# Patient Record
Sex: FEMALE | Race: WHITE | NOT HISPANIC OR LATINO | Employment: OTHER | URBAN - METROPOLITAN AREA
[De-identification: names, ages, dates, MRNs, and addresses within clinical notes are randomized per-mention and may not be internally consistent; named-entity substitution may affect disease eponyms.]

---

## 2017-05-17 ENCOUNTER — RECORDS - HEALTHEAST (OUTPATIENT)
Dept: LAB | Facility: CLINIC | Age: 82
End: 2017-05-17

## 2017-05-18 LAB — HBA1C MFR BLD: 5.8 % (ref 4.2–6.1)

## 2017-05-30 ENCOUNTER — RECORDS - HEALTHEAST (OUTPATIENT)
Dept: BONE DENSITY | Facility: CLINIC | Age: 82
End: 2017-05-30

## 2017-05-30 ENCOUNTER — RECORDS - HEALTHEAST (OUTPATIENT)
Dept: ADMINISTRATIVE | Facility: OTHER | Age: 82
End: 2017-05-30

## 2017-05-30 DIAGNOSIS — M81.0 AGE-RELATED OSTEOPOROSIS WITHOUT CURRENT PATHOLOGICAL FRACTURE: ICD-10-CM

## 2018-09-22 ENCOUNTER — RECORDS - HEALTHEAST (OUTPATIENT)
Dept: LAB | Facility: CLINIC | Age: 83
End: 2018-09-22

## 2018-09-22 LAB
ALBUMIN UR-MCNC: NEGATIVE MG/DL
APPEARANCE UR: CLEAR
BILIRUB UR QL STRIP: NEGATIVE
COLOR UR AUTO: NORMAL
GLUCOSE UR STRIP-MCNC: NEGATIVE MG/DL
HGB UR QL STRIP: NEGATIVE
KETONES UR STRIP-MCNC: NEGATIVE MG/DL
LEUKOCYTE ESTERASE UR QL STRIP: NEGATIVE
NITRATE UR QL: NEGATIVE
PH UR STRIP: 6.5 [PH] (ref 4.5–8)
SP GR UR STRIP: 1.01 (ref 1–1.03)
UROBILINOGEN UR STRIP-ACNC: NORMAL

## 2018-09-23 LAB — BACTERIA SPEC CULT: NO GROWTH

## 2018-12-01 ENCOUNTER — RECORDS - HEALTHEAST (OUTPATIENT)
Dept: LAB | Facility: CLINIC | Age: 83
End: 2018-12-01

## 2018-12-03 LAB
ANION GAP SERPL CALCULATED.3IONS-SCNC: 7 MMOL/L (ref 5–18)
BUN SERPL-MCNC: 10 MG/DL (ref 8–28)
CALCIUM SERPL-MCNC: 9 MG/DL (ref 8.5–10.5)
CHLORIDE BLD-SCNC: 97 MMOL/L (ref 98–107)
CO2 SERPL-SCNC: 23 MMOL/L (ref 22–31)
CREAT SERPL-MCNC: 0.76 MG/DL (ref 0.6–1.1)
GFR SERPL CREATININE-BSD FRML MDRD: >60 ML/MIN/1.73M2
GLUCOSE BLD-MCNC: 98 MG/DL (ref 70–125)
POTASSIUM BLD-SCNC: 5.2 MMOL/L (ref 3.5–5)
SODIUM SERPL-SCNC: 127 MMOL/L (ref 136–145)

## 2019-01-04 ENCOUNTER — RECORDS - HEALTHEAST (OUTPATIENT)
Dept: LAB | Facility: CLINIC | Age: 84
End: 2019-01-04

## 2019-01-07 LAB — POTASSIUM BLD-SCNC: 4.5 MMOL/L (ref 3.5–5)

## 2019-05-28 ENCOUNTER — RECORDS - HEALTHEAST (OUTPATIENT)
Dept: LAB | Facility: CLINIC | Age: 84
End: 2019-05-28

## 2019-05-28 LAB
ANION GAP SERPL CALCULATED.3IONS-SCNC: 7 MMOL/L (ref 5–18)
BUN SERPL-MCNC: 13 MG/DL (ref 8–28)
CALCIUM SERPL-MCNC: 9.8 MG/DL (ref 8.5–10.5)
CHLORIDE BLD-SCNC: 98 MMOL/L (ref 98–107)
CO2 SERPL-SCNC: 26 MMOL/L (ref 22–31)
CREAT SERPL-MCNC: 0.8 MG/DL (ref 0.6–1.1)
GFR SERPL CREATININE-BSD FRML MDRD: >60 ML/MIN/1.73M2
GLUCOSE BLD-MCNC: 93 MG/DL (ref 70–125)
POTASSIUM BLD-SCNC: 4.8 MMOL/L (ref 3.5–5)
SODIUM SERPL-SCNC: 131 MMOL/L (ref 136–145)

## 2019-12-31 ENCOUNTER — RECORDS - HEALTHEAST (OUTPATIENT)
Dept: LAB | Facility: CLINIC | Age: 84
End: 2019-12-31

## 2019-12-31 LAB
ANION GAP SERPL CALCULATED.3IONS-SCNC: 9 MMOL/L (ref 5–18)
BUN SERPL-MCNC: 11 MG/DL (ref 8–28)
CALCIUM SERPL-MCNC: 9.7 MG/DL (ref 8.5–10.5)
CHLORIDE BLD-SCNC: 98 MMOL/L (ref 98–107)
CO2 SERPL-SCNC: 21 MMOL/L (ref 22–31)
CREAT SERPL-MCNC: 0.82 MG/DL (ref 0.6–1.1)
GFR SERPL CREATININE-BSD FRML MDRD: >60 ML/MIN/1.73M2
GLUCOSE BLD-MCNC: 96 MG/DL (ref 70–125)
HGB BLD-MCNC: 13.4 G/DL (ref 12–16)
POTASSIUM BLD-SCNC: 4.6 MMOL/L (ref 3.5–5)
SODIUM SERPL-SCNC: 128 MMOL/L (ref 136–145)

## 2020-11-29 ENCOUNTER — RECORDS - HEALTHEAST (OUTPATIENT)
Dept: LAB | Facility: CLINIC | Age: 85
End: 2020-11-29

## 2020-12-01 LAB
ANION GAP SERPL CALCULATED.3IONS-SCNC: 7 MMOL/L (ref 5–18)
BUN SERPL-MCNC: 12 MG/DL (ref 8–28)
CALCIUM SERPL-MCNC: 9.5 MG/DL (ref 8.5–10.5)
CHLORIDE BLD-SCNC: 95 MMOL/L (ref 98–107)
CO2 SERPL-SCNC: 27 MMOL/L (ref 22–31)
CREAT SERPL-MCNC: 0.85 MG/DL (ref 0.6–1.1)
ERYTHROCYTE [DISTWIDTH] IN BLOOD BY AUTOMATED COUNT: 14.6 % (ref 11–14.5)
GFR SERPL CREATININE-BSD FRML MDRD: >60 ML/MIN/1.73M2
GLUCOSE BLD-MCNC: 75 MG/DL (ref 70–125)
HCT VFR BLD AUTO: 39.1 % (ref 35–47)
HGB BLD-MCNC: 12.9 G/DL (ref 12–16)
MCH RBC QN AUTO: 27.9 PG (ref 27–34)
MCHC RBC AUTO-ENTMCNC: 33 G/DL (ref 32–36)
MCV RBC AUTO: 85 FL (ref 80–100)
PLATELET # BLD AUTO: 325 THOU/UL (ref 140–440)
PMV BLD AUTO: 9.9 FL (ref 8.5–12.5)
POTASSIUM BLD-SCNC: 4.3 MMOL/L (ref 3.5–5)
RBC # BLD AUTO: 4.62 MILL/UL (ref 3.8–5.4)
SODIUM SERPL-SCNC: 129 MMOL/L (ref 136–145)
WBC: 8.8 THOU/UL (ref 4–11)

## 2021-06-12 ENCOUNTER — RECORDS - HEALTHEAST (OUTPATIENT)
Dept: LAB | Facility: CLINIC | Age: 86
End: 2021-06-12

## 2021-06-15 LAB
ALBUMIN SERPL-MCNC: 3.2 G/DL (ref 3.5–5)
ALP SERPL-CCNC: 67 U/L (ref 45–120)
ALT SERPL W P-5'-P-CCNC: 9 U/L (ref 0–45)
ANION GAP SERPL CALCULATED.3IONS-SCNC: 10 MMOL/L (ref 5–18)
AST SERPL W P-5'-P-CCNC: 11 U/L (ref 0–40)
BILIRUB DIRECT SERPL-MCNC: 0.2 MG/DL
BILIRUB SERPL-MCNC: 0.3 MG/DL (ref 0–1)
BUN SERPL-MCNC: 10 MG/DL (ref 8–28)
CALCIUM SERPL-MCNC: 9.2 MG/DL (ref 8.5–10.5)
CHLORIDE BLD-SCNC: 95 MMOL/L (ref 98–107)
CO2 SERPL-SCNC: 24 MMOL/L (ref 22–31)
CREAT SERPL-MCNC: 0.79 MG/DL (ref 0.6–1.1)
GFR SERPL CREATININE-BSD FRML MDRD: >60 ML/MIN/1.73M2
GLUCOSE BLD-MCNC: 95 MG/DL (ref 70–125)
POTASSIUM BLD-SCNC: 4.4 MMOL/L (ref 3.5–5)
PROT SERPL-MCNC: 7.2 G/DL (ref 6–8)
SODIUM SERPL-SCNC: 129 MMOL/L (ref 136–145)
TSH SERPL DL<=0.005 MIU/L-ACNC: 1.98 UIU/ML (ref 0.3–5)

## 2021-06-16 LAB — 25(OH)D3 SERPL-MCNC: 25.2 NG/ML (ref 30–80)

## 2021-10-15 ENCOUNTER — LAB REQUISITION (OUTPATIENT)
Dept: LAB | Facility: CLINIC | Age: 86
End: 2021-10-15
Payer: COMMERCIAL

## 2021-10-15 DIAGNOSIS — E87.1 HYPO-OSMOLALITY AND HYPONATREMIA: ICD-10-CM

## 2021-10-15 DIAGNOSIS — E55.9 VITAMIN D DEFICIENCY, UNSPECIFIED: ICD-10-CM

## 2021-10-18 LAB — SODIUM SERPL-SCNC: 132 MMOL/L (ref 136–145)

## 2021-10-18 PROCEDURE — 36415 COLL VENOUS BLD VENIPUNCTURE: CPT | Mod: ORL | Performed by: NURSE PRACTITIONER

## 2021-10-18 PROCEDURE — 82306 VITAMIN D 25 HYDROXY: CPT | Mod: ORL | Performed by: NURSE PRACTITIONER

## 2021-10-18 PROCEDURE — 84295 ASSAY OF SERUM SODIUM: CPT | Mod: ORL | Performed by: NURSE PRACTITIONER

## 2021-10-19 LAB — DEPRECATED CALCIDIOL+CALCIFEROL SERPL-MC: 42 UG/L (ref 30–80)

## 2022-03-18 ENCOUNTER — LAB REQUISITION (OUTPATIENT)
Dept: LAB | Facility: CLINIC | Age: 87
End: 2022-03-18
Payer: COMMERCIAL

## 2022-03-18 DIAGNOSIS — E87.1 HYPO-OSMOLALITY AND HYPONATREMIA: ICD-10-CM

## 2022-03-18 DIAGNOSIS — I10 ESSENTIAL (PRIMARY) HYPERTENSION: ICD-10-CM

## 2022-03-21 LAB
ANION GAP SERPL CALCULATED.3IONS-SCNC: 9 MMOL/L (ref 5–18)
BUN SERPL-MCNC: 12 MG/DL (ref 8–28)
CALCIUM SERPL-MCNC: 9.2 MG/DL (ref 8.5–10.5)
CHLORIDE BLD-SCNC: 98 MMOL/L (ref 98–107)
CO2 SERPL-SCNC: 24 MMOL/L (ref 22–31)
CREAT SERPL-MCNC: 0.77 MG/DL (ref 0.6–1.1)
GFR SERPL CREATININE-BSD FRML MDRD: 74 ML/MIN/1.73M2
GLUCOSE BLD-MCNC: 103 MG/DL (ref 70–125)
POTASSIUM BLD-SCNC: 4.4 MMOL/L (ref 3.5–5)
SODIUM SERPL-SCNC: 131 MMOL/L (ref 136–145)

## 2022-03-21 PROCEDURE — P9603 ONE-WAY ALLOW PRORATED MILES: HCPCS | Mod: ORL | Performed by: INTERNAL MEDICINE

## 2022-03-21 PROCEDURE — 36415 COLL VENOUS BLD VENIPUNCTURE: CPT | Mod: ORL | Performed by: INTERNAL MEDICINE

## 2022-03-21 PROCEDURE — 80048 BASIC METABOLIC PNL TOTAL CA: CPT | Mod: ORL | Performed by: INTERNAL MEDICINE

## 2022-10-23 ENCOUNTER — LAB REQUISITION (OUTPATIENT)
Dept: LAB | Facility: CLINIC | Age: 87
End: 2022-10-23
Payer: COMMERCIAL

## 2022-10-23 DIAGNOSIS — Z03.818 ENCOUNTER FOR OBSERVATION FOR SUSPECTED EXPOSURE TO OTHER BIOLOGICAL AGENTS RULED OUT: ICD-10-CM

## 2022-10-23 PROCEDURE — U0003 INFECTIOUS AGENT DETECTION BY NUCLEIC ACID (DNA OR RNA); SEVERE ACUTE RESPIRATORY SYNDROME CORONAVIRUS 2 (SARS-COV-2) (CORONAVIRUS DISEASE [COVID-19]), AMPLIFIED PROBE TECHNIQUE, MAKING USE OF HIGH THROUGHPUT TECHNOLOGIES AS DESCRIBED BY CMS-2020-01-R: HCPCS | Mod: ORL | Performed by: NURSE PRACTITIONER

## 2022-10-24 LAB — SARS-COV-2 RNA RESP QL NAA+PROBE: POSITIVE

## 2023-02-01 ENCOUNTER — LAB REQUISITION (OUTPATIENT)
Dept: LAB | Facility: CLINIC | Age: 88
End: 2023-02-01
Payer: COMMERCIAL

## 2023-02-01 DIAGNOSIS — E55.9 VITAMIN D DEFICIENCY, UNSPECIFIED: ICD-10-CM

## 2023-02-01 DIAGNOSIS — E78.5 HYPERLIPIDEMIA, UNSPECIFIED: ICD-10-CM

## 2023-02-01 DIAGNOSIS — I10 ESSENTIAL (PRIMARY) HYPERTENSION: ICD-10-CM

## 2023-02-02 LAB
ANION GAP SERPL CALCULATED.3IONS-SCNC: 13 MMOL/L (ref 7–15)
BUN SERPL-MCNC: 11.7 MG/DL (ref 8–23)
CALCIUM SERPL-MCNC: 9.4 MG/DL (ref 8.8–10.2)
CHLORIDE SERPL-SCNC: 99 MMOL/L (ref 98–107)
CREAT SERPL-MCNC: 0.84 MG/DL (ref 0.51–0.95)
DEPRECATED HCO3 PLAS-SCNC: 21 MMOL/L (ref 22–29)
ERYTHROCYTE [DISTWIDTH] IN BLOOD BY AUTOMATED COUNT: 14.9 % (ref 10–15)
GFR SERPL CREATININE-BSD FRML MDRD: 66 ML/MIN/1.73M2
GLUCOSE SERPL-MCNC: 96 MG/DL (ref 70–99)
HCT VFR BLD AUTO: 40.5 % (ref 35–47)
HGB BLD-MCNC: 13.2 G/DL (ref 11.7–15.7)
MCH RBC QN AUTO: 27.8 PG (ref 26.5–33)
MCHC RBC AUTO-ENTMCNC: 32.6 G/DL (ref 31.5–36.5)
MCV RBC AUTO: 85 FL (ref 78–100)
PLATELET # BLD AUTO: 382 10E3/UL (ref 150–450)
POTASSIUM SERPL-SCNC: 4.3 MMOL/L (ref 3.4–5.3)
RBC # BLD AUTO: 4.74 10E6/UL (ref 3.8–5.2)
SODIUM SERPL-SCNC: 133 MMOL/L (ref 136–145)
TSH SERPL DL<=0.005 MIU/L-ACNC: 3.51 UIU/ML (ref 0.3–4.2)
WBC # BLD AUTO: 11.1 10E3/UL (ref 4–11)

## 2023-02-02 PROCEDURE — 84443 ASSAY THYROID STIM HORMONE: CPT | Mod: ORL | Performed by: NURSE PRACTITIONER

## 2023-02-02 PROCEDURE — 82306 VITAMIN D 25 HYDROXY: CPT | Mod: ORL | Performed by: NURSE PRACTITIONER

## 2023-02-02 PROCEDURE — 85027 COMPLETE CBC AUTOMATED: CPT | Mod: ORL | Performed by: NURSE PRACTITIONER

## 2023-02-02 PROCEDURE — 80048 BASIC METABOLIC PNL TOTAL CA: CPT | Mod: ORL | Performed by: NURSE PRACTITIONER

## 2023-02-02 PROCEDURE — P9604 ONE-WAY ALLOW PRORATED TRIP: HCPCS | Mod: ORL | Performed by: NURSE PRACTITIONER

## 2023-02-02 PROCEDURE — 36415 COLL VENOUS BLD VENIPUNCTURE: CPT | Mod: ORL | Performed by: NURSE PRACTITIONER

## 2023-02-03 LAB — DEPRECATED CALCIDIOL+CALCIFEROL SERPL-MC: 71 UG/L (ref 20–75)

## 2024-03-17 ENCOUNTER — APPOINTMENT (OUTPATIENT)
Dept: CT IMAGING | Facility: HOSPITAL | Age: 89
End: 2024-03-17
Attending: EMERGENCY MEDICINE
Payer: COMMERCIAL

## 2024-03-17 ENCOUNTER — HOSPITAL ENCOUNTER (OUTPATIENT)
Facility: HOSPITAL | Age: 89
Setting detail: OBSERVATION
Discharge: NURSING, REHAB OR LONG-TERM CARE | End: 2024-03-19
Attending: EMERGENCY MEDICINE | Admitting: STUDENT IN AN ORGANIZED HEALTH CARE EDUCATION/TRAINING PROGRAM
Payer: COMMERCIAL

## 2024-03-17 ENCOUNTER — APPOINTMENT (OUTPATIENT)
Dept: RADIOLOGY | Facility: HOSPITAL | Age: 89
End: 2024-03-17
Attending: EMERGENCY MEDICINE
Payer: COMMERCIAL

## 2024-03-17 DIAGNOSIS — R55 SYNCOPE AND COLLAPSE: ICD-10-CM

## 2024-03-17 DIAGNOSIS — S02.2XXA CLOSED FRACTURE OF NASAL BONE, INITIAL ENCOUNTER: ICD-10-CM

## 2024-03-17 DIAGNOSIS — Z86.79 HISTORY OF CORONARY ARTERY DISEASE: ICD-10-CM

## 2024-03-17 DIAGNOSIS — Z86.79 HISTORY OF HYPERTENSION: ICD-10-CM

## 2024-03-17 DIAGNOSIS — S42.211A CLOSED DISPLACED FRACTURE OF SURGICAL NECK OF RIGHT HUMERUS, UNSPECIFIED FRACTURE MORPHOLOGY, INITIAL ENCOUNTER: ICD-10-CM

## 2024-03-17 LAB
ANION GAP SERPL CALCULATED.3IONS-SCNC: 14 MMOL/L (ref 7–15)
BASOPHILS # BLD AUTO: 0.1 10E3/UL (ref 0–0.2)
BASOPHILS NFR BLD AUTO: 1 %
BUN SERPL-MCNC: 12.5 MG/DL (ref 8–23)
CALCIUM SERPL-MCNC: 9.8 MG/DL (ref 8.8–10.2)
CHLORIDE SERPL-SCNC: 99 MMOL/L (ref 98–107)
CREAT SERPL-MCNC: 0.91 MG/DL (ref 0.51–0.95)
DEPRECATED HCO3 PLAS-SCNC: 20 MMOL/L (ref 22–29)
EGFRCR SERPLBLD CKD-EPI 2021: 60 ML/MIN/1.73M2
EOSINOPHIL # BLD AUTO: 0.2 10E3/UL (ref 0–0.7)
EOSINOPHIL NFR BLD AUTO: 1 %
ERYTHROCYTE [DISTWIDTH] IN BLOOD BY AUTOMATED COUNT: 14.6 % (ref 10–15)
ERYTHROCYTE [DISTWIDTH] IN BLOOD BY AUTOMATED COUNT: 14.7 % (ref 10–15)
GLUCOSE SERPL-MCNC: 148 MG/DL (ref 70–99)
HCT VFR BLD AUTO: 38.9 % (ref 35–47)
HCT VFR BLD AUTO: 39.3 % (ref 35–47)
HGB BLD-MCNC: 12.8 G/DL (ref 11.7–15.7)
HGB BLD-MCNC: 12.9 G/DL (ref 11.7–15.7)
HOLD SPECIMEN: NORMAL
IMM GRANULOCYTES # BLD: 0.1 10E3/UL
IMM GRANULOCYTES NFR BLD: 1 %
LYMPHOCYTES # BLD AUTO: 3.7 10E3/UL (ref 0.8–5.3)
LYMPHOCYTES NFR BLD AUTO: 35 %
MAGNESIUM SERPL-MCNC: 2.2 MG/DL (ref 1.7–2.3)
MCH RBC QN AUTO: 27.9 PG (ref 26.5–33)
MCH RBC QN AUTO: 28 PG (ref 26.5–33)
MCHC RBC AUTO-ENTMCNC: 32.8 G/DL (ref 31.5–36.5)
MCHC RBC AUTO-ENTMCNC: 32.9 G/DL (ref 31.5–36.5)
MCV RBC AUTO: 85 FL (ref 78–100)
MCV RBC AUTO: 85 FL (ref 78–100)
MONOCYTES # BLD AUTO: 0.7 10E3/UL (ref 0–1.3)
MONOCYTES NFR BLD AUTO: 7 %
NEUTROPHILS # BLD AUTO: 5.9 10E3/UL (ref 1.6–8.3)
NEUTROPHILS NFR BLD AUTO: 55 %
NRBC # BLD AUTO: 0 10E3/UL
NRBC BLD AUTO-RTO: 0 /100
PLATELET # BLD AUTO: 327 10E3/UL (ref 150–450)
PLATELET # BLD AUTO: 333 10E3/UL (ref 150–450)
POTASSIUM SERPL-SCNC: 4.1 MMOL/L (ref 3.4–5.3)
RBC # BLD AUTO: 4.57 10E6/UL (ref 3.8–5.2)
RBC # BLD AUTO: 4.63 10E6/UL (ref 3.8–5.2)
SODIUM SERPL-SCNC: 133 MMOL/L (ref 135–145)
T4 FREE SERPL-MCNC: 1.31 NG/DL (ref 0.9–1.7)
TROPONIN T SERPL HS-MCNC: 16 NG/L
TSH SERPL DL<=0.005 MIU/L-ACNC: 6.39 UIU/ML (ref 0.3–4.2)
WBC # BLD AUTO: 10.6 10E3/UL (ref 4–11)
WBC # BLD AUTO: 15.9 10E3/UL (ref 4–11)

## 2024-03-17 PROCEDURE — 84484 ASSAY OF TROPONIN QUANT: CPT | Performed by: EMERGENCY MEDICINE

## 2024-03-17 PROCEDURE — 250N000011 HC RX IP 250 OP 636: Performed by: STUDENT IN AN ORGANIZED HEALTH CARE EDUCATION/TRAINING PROGRAM

## 2024-03-17 PROCEDURE — 96376 TX/PRO/DX INJ SAME DRUG ADON: CPT

## 2024-03-17 PROCEDURE — 73030 X-RAY EXAM OF SHOULDER: CPT | Mod: RT

## 2024-03-17 PROCEDURE — 258N000003 HC RX IP 258 OP 636: Performed by: EMERGENCY MEDICINE

## 2024-03-17 PROCEDURE — 36415 COLL VENOUS BLD VENIPUNCTURE: CPT | Performed by: EMERGENCY MEDICINE

## 2024-03-17 PROCEDURE — 93005 ELECTROCARDIOGRAM TRACING: CPT | Performed by: EMERGENCY MEDICINE

## 2024-03-17 PROCEDURE — 96375 TX/PRO/DX INJ NEW DRUG ADDON: CPT

## 2024-03-17 PROCEDURE — 36415 COLL VENOUS BLD VENIPUNCTURE: CPT | Performed by: STUDENT IN AN ORGANIZED HEALTH CARE EDUCATION/TRAINING PROGRAM

## 2024-03-17 PROCEDURE — 83735 ASSAY OF MAGNESIUM: CPT | Performed by: EMERGENCY MEDICINE

## 2024-03-17 PROCEDURE — 96361 HYDRATE IV INFUSION ADD-ON: CPT

## 2024-03-17 PROCEDURE — 70450 CT HEAD/BRAIN W/O DYE: CPT

## 2024-03-17 PROCEDURE — 96374 THER/PROPH/DIAG INJ IV PUSH: CPT

## 2024-03-17 PROCEDURE — 84443 ASSAY THYROID STIM HORMONE: CPT | Performed by: EMERGENCY MEDICINE

## 2024-03-17 PROCEDURE — 85027 COMPLETE CBC AUTOMATED: CPT | Mod: 91 | Performed by: STUDENT IN AN ORGANIZED HEALTH CARE EDUCATION/TRAINING PROGRAM

## 2024-03-17 PROCEDURE — 250N000011 HC RX IP 250 OP 636: Performed by: EMERGENCY MEDICINE

## 2024-03-17 PROCEDURE — 70486 CT MAXILLOFACIAL W/O DYE: CPT

## 2024-03-17 PROCEDURE — 250N000013 HC RX MED GY IP 250 OP 250 PS 637: Performed by: STUDENT IN AN ORGANIZED HEALTH CARE EDUCATION/TRAINING PROGRAM

## 2024-03-17 PROCEDURE — 71045 X-RAY EXAM CHEST 1 VIEW: CPT

## 2024-03-17 PROCEDURE — 73060 X-RAY EXAM OF HUMERUS: CPT | Mod: RT

## 2024-03-17 PROCEDURE — 85025 COMPLETE CBC W/AUTO DIFF WBC: CPT | Performed by: EMERGENCY MEDICINE

## 2024-03-17 PROCEDURE — 84439 ASSAY OF FREE THYROXINE: CPT | Performed by: EMERGENCY MEDICINE

## 2024-03-17 PROCEDURE — G0378 HOSPITAL OBSERVATION PER HR: HCPCS

## 2024-03-17 PROCEDURE — 72125 CT NECK SPINE W/O DYE: CPT

## 2024-03-17 PROCEDURE — 99285 EMERGENCY DEPT VISIT HI MDM: CPT | Mod: 25

## 2024-03-17 PROCEDURE — 87040 BLOOD CULTURE FOR BACTERIA: CPT | Performed by: STUDENT IN AN ORGANIZED HEALTH CARE EDUCATION/TRAINING PROGRAM

## 2024-03-17 PROCEDURE — 258N000003 HC RX IP 258 OP 636: Performed by: STUDENT IN AN ORGANIZED HEALTH CARE EDUCATION/TRAINING PROGRAM

## 2024-03-17 PROCEDURE — 80048 BASIC METABOLIC PNL TOTAL CA: CPT | Performed by: EMERGENCY MEDICINE

## 2024-03-17 PROCEDURE — 99223 1ST HOSP IP/OBS HIGH 75: CPT | Performed by: STUDENT IN AN ORGANIZED HEALTH CARE EDUCATION/TRAINING PROGRAM

## 2024-03-17 RX ORDER — NALOXONE HYDROCHLORIDE 0.4 MG/ML
0.2 INJECTION, SOLUTION INTRAMUSCULAR; INTRAVENOUS; SUBCUTANEOUS
Status: DISCONTINUED | OUTPATIENT
Start: 2024-03-17 | End: 2024-03-19 | Stop reason: HOSPADM

## 2024-03-17 RX ORDER — CEFTRIAXONE 2 G/1
2 INJECTION, POWDER, FOR SOLUTION INTRAMUSCULAR; INTRAVENOUS
Status: DISCONTINUED | OUTPATIENT
Start: 2024-03-17 | End: 2024-03-18

## 2024-03-17 RX ORDER — AMLODIPINE BESYLATE 5 MG/1
5 TABLET ORAL DAILY
Status: ON HOLD | COMMUNITY
End: 2024-03-19

## 2024-03-17 RX ORDER — AMLODIPINE BESYLATE 5 MG/1
5 TABLET ORAL DAILY
Status: DISCONTINUED | OUTPATIENT
Start: 2024-03-17 | End: 2024-03-18

## 2024-03-17 RX ORDER — CHOLECALCIFEROL (VITAMIN D3) 50 MCG
2 TABLET ORAL DAILY
COMMUNITY

## 2024-03-17 RX ORDER — GUAIFENESIN 200 MG/10ML
200 LIQUID ORAL EVERY 6 HOURS PRN
COMMUNITY
End: 2024-03-22

## 2024-03-17 RX ORDER — HYDRALAZINE HYDROCHLORIDE 20 MG/ML
10 INJECTION INTRAMUSCULAR; INTRAVENOUS EVERY 6 HOURS PRN
Status: DISCONTINUED | OUTPATIENT
Start: 2024-03-17 | End: 2024-03-19 | Stop reason: HOSPADM

## 2024-03-17 RX ORDER — HYDROMORPHONE HCL IN WATER/PF 6 MG/30 ML
0.4 PATIENT CONTROLLED ANALGESIA SYRINGE INTRAVENOUS
Status: DISCONTINUED | OUTPATIENT
Start: 2024-03-17 | End: 2024-03-19 | Stop reason: HOSPADM

## 2024-03-17 RX ORDER — CARBOXYMETHYLCELLULOSE SODIUM 5 MG/ML
2 SOLUTION/ DROPS OPHTHALMIC PRN
COMMUNITY
End: 2024-03-22

## 2024-03-17 RX ORDER — ONDANSETRON 2 MG/ML
4 INJECTION INTRAMUSCULAR; INTRAVENOUS EVERY 6 HOURS PRN
Status: DISCONTINUED | OUTPATIENT
Start: 2024-03-17 | End: 2024-03-19 | Stop reason: HOSPADM

## 2024-03-17 RX ORDER — NALOXONE HYDROCHLORIDE 0.4 MG/ML
0.4 INJECTION, SOLUTION INTRAMUSCULAR; INTRAVENOUS; SUBCUTANEOUS
Status: DISCONTINUED | OUTPATIENT
Start: 2024-03-17 | End: 2024-03-19 | Stop reason: HOSPADM

## 2024-03-17 RX ORDER — ACETAMINOPHEN 325 MG/1
975 TABLET ORAL 3 TIMES DAILY PRN
Status: DISCONTINUED | OUTPATIENT
Start: 2024-03-17 | End: 2024-03-18

## 2024-03-17 RX ORDER — HYDROMORPHONE HCL IN WATER/PF 6 MG/30 ML
0.2 PATIENT CONTROLLED ANALGESIA SYRINGE INTRAVENOUS
Status: DISCONTINUED | OUTPATIENT
Start: 2024-03-17 | End: 2024-03-19 | Stop reason: HOSPADM

## 2024-03-17 RX ORDER — HYDROMORPHONE HYDROCHLORIDE 1 MG/ML
0.3 INJECTION, SOLUTION INTRAMUSCULAR; INTRAVENOUS; SUBCUTANEOUS ONCE
Status: COMPLETED | OUTPATIENT
Start: 2024-03-17 | End: 2024-03-17

## 2024-03-17 RX ORDER — ONDANSETRON 4 MG/1
4 TABLET, ORALLY DISINTEGRATING ORAL EVERY 6 HOURS PRN
Status: DISCONTINUED | OUTPATIENT
Start: 2024-03-17 | End: 2024-03-19 | Stop reason: HOSPADM

## 2024-03-17 RX ORDER — MORPHINE SULFATE 4 MG/ML
4 INJECTION, SOLUTION INTRAMUSCULAR; INTRAVENOUS ONCE
Status: COMPLETED | OUTPATIENT
Start: 2024-03-17 | End: 2024-03-17

## 2024-03-17 RX ORDER — OXYCODONE HYDROCHLORIDE 5 MG/1
5 TABLET ORAL EVERY 4 HOURS PRN
Status: DISCONTINUED | OUTPATIENT
Start: 2024-03-17 | End: 2024-03-19 | Stop reason: HOSPADM

## 2024-03-17 RX ORDER — HYDROMORPHONE HYDROCHLORIDE 1 MG/ML
0.5 INJECTION, SOLUTION INTRAMUSCULAR; INTRAVENOUS; SUBCUTANEOUS ONCE
Status: COMPLETED | OUTPATIENT
Start: 2024-03-17 | End: 2024-03-17

## 2024-03-17 RX ORDER — AMOXICILLIN 250 MG
2 CAPSULE ORAL 2 TIMES DAILY PRN
Status: DISCONTINUED | OUTPATIENT
Start: 2024-03-17 | End: 2024-03-19 | Stop reason: HOSPADM

## 2024-03-17 RX ORDER — AMOXICILLIN 250 MG
1 CAPSULE ORAL 2 TIMES DAILY PRN
Status: DISCONTINUED | OUTPATIENT
Start: 2024-03-17 | End: 2024-03-19 | Stop reason: HOSPADM

## 2024-03-17 RX ORDER — ACETAMINOPHEN 325 MG/1
650 TABLET ORAL EVERY 8 HOURS PRN
COMMUNITY
End: 2024-03-22

## 2024-03-17 RX ADMIN — AZITHROMYCIN DIHYDRATE 500 MG: 500 INJECTION, POWDER, LYOPHILIZED, FOR SOLUTION INTRAVENOUS at 19:35

## 2024-03-17 RX ADMIN — HYDROMORPHONE HYDROCHLORIDE 0.4 MG: 0.2 INJECTION, SOLUTION INTRAMUSCULAR; INTRAVENOUS; SUBCUTANEOUS at 18:45

## 2024-03-17 RX ADMIN — MORPHINE SULFATE 4 MG: 4 INJECTION, SOLUTION INTRAMUSCULAR; INTRAVENOUS at 15:33

## 2024-03-17 RX ADMIN — HYDROMORPHONE HYDROCHLORIDE 0.5 MG: 1 INJECTION, SOLUTION INTRAMUSCULAR; INTRAVENOUS; SUBCUTANEOUS at 13:46

## 2024-03-17 RX ADMIN — HYDROMORPHONE HYDROCHLORIDE 0.3 MG: 1 INJECTION, SOLUTION INTRAMUSCULAR; INTRAVENOUS; SUBCUTANEOUS at 12:45

## 2024-03-17 RX ADMIN — SODIUM CHLORIDE 250 ML: 9 INJECTION, SOLUTION INTRAVENOUS at 12:41

## 2024-03-17 RX ADMIN — ACETAMINOPHEN 325 MG: 325 TABLET ORAL at 22:37

## 2024-03-17 RX ADMIN — CEFTRIAXONE SODIUM 2 G: 2 INJECTION, POWDER, FOR SOLUTION INTRAMUSCULAR; INTRAVENOUS at 18:59

## 2024-03-17 RX ADMIN — ONDANSETRON 4 MG: 4 TABLET, ORALLY DISINTEGRATING ORAL at 20:29

## 2024-03-17 ASSESSMENT — ENCOUNTER SYMPTOMS: FACIAL SWELLING: 1

## 2024-03-17 ASSESSMENT — ACTIVITIES OF DAILY LIVING (ADL)
ADLS_ACUITY_SCORE: 35
ADLS_ACUITY_SCORE: 31
ADLS_ACUITY_SCORE: 36
ADLS_ACUITY_SCORE: 35
ADLS_ACUITY_SCORE: 35
ADLS_ACUITY_SCORE: 36
DEPENDENT_IADLS:: CLEANING;COOKING;MEDICATION MANAGEMENT;TRANSPORTATION
ADLS_ACUITY_SCORE: 31
ADLS_ACUITY_SCORE: 35

## 2024-03-17 ASSESSMENT — COLUMBIA-SUICIDE SEVERITY RATING SCALE - C-SSRS
2. HAVE YOU ACTUALLY HAD ANY THOUGHTS OF KILLING YOURSELF IN THE PAST MONTH?: NO
1. IN THE PAST MONTH, HAVE YOU WISHED YOU WERE DEAD OR WISHED YOU COULD GO TO SLEEP AND NOT WAKE UP?: NO
6. HAVE YOU EVER DONE ANYTHING, STARTED TO DO ANYTHING, OR PREPARED TO DO ANYTHING TO END YOUR LIFE?: NO

## 2024-03-17 NOTE — ED TRIAGE NOTES
Pt was at [employer], was walking out when she felt dizzy. Pt fell to the ground and lost consciousness for about 30 seconds. Pt awoke and c/o face and right shoulder pain.. EMS splinted the arm and transported Pt to ED room 5.     Triage Assessment (Adult)       Row Name 03/17/24 1230          Triage Assessment    Airway WDL WDL        Respiratory WDL    Respiratory WDL WDL        Skin Circulation/Temperature WDL    Skin Circulation/Temperature WDL temperature     Skin Temperature cool        Cardiac WDL    Cardiac WDL WDL     Cardiac Rhythm SB        Peripheral/Neurovascular WDL    Peripheral Neurovascular WDL WDL        Cognitive/Neuro/Behavioral WDL    Cognitive/Neuro/Behavioral WDL WDL

## 2024-03-17 NOTE — ED NOTES
Bed: JNED-05  Expected date:   Expected time:   Means of arrival:   Comments:  Luda BROWN Syncope

## 2024-03-17 NOTE — ED NOTES
"[facility] ED Handoff Report    ED Chief Complaint: Fall, head injury, shoulder injury.     ED Diagnosis:  (R55) Syncope and collapse  Comment: Witnessed syncope at [employer] with 30 seconds of LOC.   Plan: Monitor EKG, LOC.     (S02.2XXA) Closed fracture of nasal bone, initial encounter  Comment: Fx in several places.   Plan: Pain management.     (S42.211A) Closed displaced fracture of surgical neck of right humerus, unspecified fracture morphology, initial encounter  Comment: R shoulder pain, chip off of R humerus.   Plan: Sling, pain management.     (Z86.79) History of hypertension  Comment: Initially HTN up to 190's.   Plan: Improved to 140's systolic.     PMH:  No past medical history on file.     Code Status:  No Order     Falls Risk: Yes Band: Applied    Current Living Situation/Residence: lives in an assisted living facility     Elimination Status: Continent: Yes     Activity Level: Total assist/lift, usually independent, has not been up since coming to ED. Hover mat in place.     Patients Preferred Language:  English     Needed: No    Vital Signs:  /61   Pulse 58   Temp 97.9  F (36.6  C) (Oral)   Resp 16   Ht 1.549 m (5' 1\")   Wt 61.2 kg (135 lb)   SpO2 96%   BMI 25.51 kg/m       Cardiac Rhythm: SR.     Pain Score: 8/10    Is the Patient Confused:  Yes    Last Food or Drink: 03/17/24 at 0800     Focused Assessment:  CMS intact in all extremities. RUE painful to move at shoulder. No crepitus noted. Pt's nose and forehead are swollen with small bleeding abrasions which were cleaned and closed with derma-bond. Pt endorses severe pain that has been minimally helped with IV narcotics.     Tests Performed: Done: Labs and Imaging    Treatments Provided:  IV, pain meds, imaging, would care, sling.     Family Dynamics/Concerns: No    Family Updated On Visitor Policy: Yes    Plan of Care Communicated to Family: Yes    Who Was Updated about Plan of Care: Pt's son and friend. "     Belongings Checklist Done and Signed by Patient: Yes    Medications sent with patient: None.     Covid: asymptomatic , Not tested.     Additional Information: Pt denies falling, saying she tripping but witnesses say she had a loss of consciousness.     RN: Abad Lai RN 3/17/2024 4:51 PM

## 2024-03-17 NOTE — PHARMACY-ADMISSION MEDICATION HISTORY
Pharmacist Admission Medication History    Admission medication history is complete. The information provided in this note is only as accurate as the sources available at the time of the update.    Information Source(s): Patient, Family member, Clinic records, Facility ([facility] medication list/MAR, and CareEverywhere/SureScripts via in-person    Pertinent Information: none    Changes made to PTA medication list:  Added: all - patient new to system. But no new meds to her  Deleted: None  Changed: None    Allergies reviewed with patient and updates made in EHR: yes    Medication History Completed By: Shad Perez [facility] 3/17/2024 4:59 PM    PTA Med List   Medication Sig Last Dose    acetaminophen (TYLENOL) 325 MG tablet Take 650 mg by mouth every 8 hours as needed for mild pain     amLODIPine (NORVASC) 5 MG tablet Take 5 mg by mouth daily 3/17/2024 at am    calcium carbonate-vitamin D (OSCAL) 500-5 MG-MCG tablet Take 1 tablet by mouth daily 3/17/2024 at am    carboxymethylcellulose PF (REFRESH PLUS) 0.5 % ophthalmic solution 2 drops as needed for dry eyes     guaiFENesin (ROBITUSSIN) 20 mg/mL liquid Take 200 mg by mouth every 6 hours as needed for cough     Multiple Vitamins-Minerals (PRESERVISION AREDS 2 PO) Take 1 tablet by mouth 2 times daily 3/17/2024 at am    vitamin D3 (CHOLECALCIFEROL) 50 mcg (2000 units) tablet Take 2 tablets by mouth daily 3/17/2024 at am

## 2024-03-17 NOTE — CONSULTS
Care Management Initial Consult    General Information  Assessment completed with: Patient, Family, patient, ex dtr-in-law Jasmina  Type of CM/SW Visit: Initial Assessment    Primary Care Provider verified and updated as needed: Yes   Readmission within the last 30 days: no previous admission in last 30 days      Reason for Consult: discharge planning  Advance Care Planning:            Communication Assessment  Patient's communication style: spoken language (English or Bilingual)             Cognitive  Cognitive/Neuro/Behavioral: WDL                      Living Environment:   People in home: facility resident     Current living Arrangements: assisted living  Name of Facility: [facility]   Able to return to prior arrangements: other (see comments) (TBD)       Family/Social Support:  Care provided by: self, child(leanna)  Provides care for: no one  Marital Status:   Children, Facility resident(s)/Staff          Description of Support System: Supportive, Involved    Support Assessment: Patient communicates needs well met, Adequate family and caregiver support, Adequate social supports    Current Resources:   Patient receiving home care services: No     Community Resources: Skilled Nursing Facility  Equipment currently used at home:    Supplies currently used at home: Incontinence Supplies    Employment/Financial:  Employment Status: retired        Financial Concerns:             Does the patient's insurance plan have a 3 day qualifying hospital stay waiver?      Lifestyle & Psychosocial Needs:  Social Determinants of Health     Food Insecurity: Not on file   Depression: Not on file   Housing Stability: Not on file   Tobacco Use: Not on file   Financial Resource Strain: Not on file   Alcohol Use: Not on file   Transportation Needs: Not on file   Physical Activity: Not on file   Interpersonal Safety: Not on file   Stress: Not on file   Social Connections: Not on file       Functional Status:  Prior to  admission patient needed assistance:   Dependent ADLs:: Independent  Dependent IADLs:: Cleaning, Cooking, Medication Management, Transportation  Assesssment of Functional Status: Not at  functional baseline    Mental Health Status:          Chemical Dependency Status:                Values/Beliefs:  Spiritual, Cultural Beliefs, Evangelical Practices, Values that affect care:                 Additional Information:  Met with patient, son Bertram and RYDER Freedman at the bedside for initial assessment. Introduced self and care management role. Patient resides at [facility] in [place]. She is independent with her ADLs at baseline and family and facility assist with her IADLs. Facility provides meals, housekeeping, medication management and provides a van for transports for MD appointments and shopping. Patient uses no DME and has no outside services.    Son Bertram main contact and has been in touch with facility to increase facility services as needed. Patient and family are very happy with the facility and staff and feel that returning there rather than a TCU would be most beneficial. Bertram is agreeable to home care services if recommended.     WILLIAMSON discussed. CM to follow hospital progression, treatment team recommendations and assist with discharge planning as needed.     Family to transport.     Susan Morales RN

## 2024-03-17 NOTE — H&P
[facility]    History and Physical - Hospitalist Service       Date of Admission:  3/17/2024    Assessment & Plan   Vale Solares is a 89 year old female with PMHx of HTN, chronic hyponatremia, CAD though no prior complications who presented as a trauma patient after a fall at [employer].     #Possible syncope  #Fall  The patient reports feeling like her usual self leading up to the episode. Sudden onset dizziness and legs gave out. Doesn't recall feeling flushed or warm. She denies losing consciousness, but EMS report some period of unconsciousness. No seizure activity reported. Had stood up recently, but symptoms were not immediate onset. No ASHOK to support significant dehydration. History fits cardiac arrhythmia the most.  - Tele  - TTE  - CT head/face/c-spine with nasal fracture, otherwise no acute trauma  - Monitor trauma complications as below; low threshold for CTA C/A/P if worsening pain somewhere or hemoglobin dropping    #Left-sided pulmonary consolidation  #Pulmonary contusion versus pneumonia  #Shaking   With history of being completely baseline prior to her fall I believe the consolidation is much more likely to be a contusion than a pneumonia. But with the patient's shaking, could represent rigors.  - Draw blood cultures  - CTZ/azithromycin for now, consider de-escalating if patient remains stable    #Acute right humeral fracture  - Per ortho, sling and pain control, no plan for surgery    #Acute right-sided scalp and periorbital hematoma  - Would repeat CT head for any changes in cognition, headache, or neuro symptoms    #Acute nasal fracture  No epistaxis, should follow up with ENT.  - ENT referral    #Primary HTN  - Hold home amlodipine while monitoring for bleeding    #History of CAD  Per chart review, no prior MI or cardiac complications.       Observation Goals: -diagnostic tests and consults completed and resulted, -vital signs normal or at patient baseline,  "-tolerating oral intake to maintain hydration, -adequate pain control on oral analgesics, Nurse to notify provider when observation goals have been met and patient is ready for discharge.  Diet: NPO for Medical/Clinical Reasons Except for: Ice Chips, Meds    DVT Prophylaxis: Pneumatic Compression Devices  Vernon Catheter: Not present  Lines: None     Cardiac Monitoring: ACTIVE order. Indication: Trauma  Code Status: Full Code      Clinically Significant Risk Factors Present on Admission                       # Overweight: Estimated body mass index is 25.51 kg/m  as calculated from the following:    Height as of this encounter: 1.549 m (5' 1\").    Weight as of this encounter: 61.2 kg (135 lb).              Disposition Plan      Expected Discharge Date: 03/18/2024      Destination: assisted living            LUCÍA MOHR MD  Hospitalist Service  [facility]  Securely message with [employer] (more info)  Text page via [facility] Paging/Directory     ______________________________________________________________________    Chief Complaint   Fall, possible syncope    History is obtained from the patient    History of Present Illness   Vale Solares is a 89 year old female with PMHx of HTN, chronic hyponatremia, CAD though no prior complications who presented as a trauma patient after a fall at [employer]. The patient reports feeling totally fine the day of admission and preceding days. Felt fine during the [employer] service. Stood up and was almost out the door of the [employer] when she suddenly felt dizzy and fell. Reports from EMS were that she was unconscious for 30 seconds, but the patient feels she never lost consciousness and was awake the whole time, but that she had a sudden onset of dizziness and her legs gave way from underneath her.    In the ED she reported feeling okay. No fever/chills. No recent URI. No abdominal pain, dysuria, rash, CP, SOB. She has pain in her right shoulder and XR " revealed a right humeral fracture. Ortho evaluated Xrs and recommended a sling to the ED physician, no plan for surgery. CT head/face/c-spine showed a nasal fracture, but no cranial or c-spine fractures or subluxation. On exam, the patient has a large right anterior scalp/periorbital hematoma; right arm is in a sling with significant pain with minimal motion. She began intermittently shaking during my exam. She remained fully awake and alert, shaking appeared irregular/metabolic, not a seizure. Doesn't drink excess alcohol.    Notably, CXR revealed a large left-sided pulmonary consolidation, most concerning for contusion, but could represent infection. WBC 10.6. Hgb 12.9.       Past Medical History    No past medical history on file.    Past Surgical History   No past surgical history on file.    Prior to Admission Medications   Prior to Admission Medications   Prescriptions Last Dose Informant Patient Reported? Taking?   Multiple Vitamins-Minerals (PRESERVISION AREDS 2 PO) 3/17/2024 at am  Yes Yes   Sig: Take 1 tablet by mouth 2 times daily   acetaminophen (TYLENOL) 325 MG tablet   Yes Yes   Sig: Take 650 mg by mouth every 8 hours as needed for mild pain   amLODIPine (NORVASC) 5 MG tablet 3/17/2024 at am  Yes Yes   Sig: Take 5 mg by mouth daily   calcium carbonate-vitamin D (OSCAL) 500-5 MG-MCG tablet 3/17/2024 at am  Yes Yes   Sig: Take 1 tablet by mouth daily   carboxymethylcellulose PF (REFRESH PLUS) 0.5 % ophthalmic solution   Yes Yes   Si drops as needed for dry eyes   guaiFENesin (ROBITUSSIN) 20 mg/mL liquid   Yes Yes   Sig: Take 200 mg by mouth every 6 hours as needed for cough   vitamin D3 (CHOLECALCIFEROL) 50 mcg (2000 units) tablet 3/17/2024 at am  Yes Yes   Sig: Take 2 tablets by mouth daily      Facility-Administered Medications: None           Physical Exam   Vital Signs: Temp: 97.5  F (36.4  C) Temp src: Oral BP: (!) 145/67 Pulse: 70   Resp: 16 SpO2: 96 % O2 Device: None (Room air)    Weight: 135  lbs 0 oz    General Appearance: NAD, intermittently shaking while remaining awake and alert  HENT: Large right anterior scalp/periorbital hematoma present  Respiratory: CTAB  Cardiovascular: RRR. No m/r/g  GI: Soft. ND. Mild TTP of the LLQ  Skin: No rashes noted on exam.  Ext: No LE edema    Medical Decision Making       100 MINUTES SPENT BY ME on the date of service doing chart review, history, exam, documentation & further activities per the note.      Data     I have personally reviewed the following data over the past 24 hrs:    10.6  \   12.9   / 327     133 (L) 99 12.5 /  148 (H)   4.1 20 (L) 0.91 \     Trop: 16 (H) BNP: N/A     TSH: 6.39 (H) T4: 1.31 A1C: N/A       Imaging results reviewed over the past 24 hrs:   Recent Results (from the past 24 hour(s))   Head CT w/o contrast    Narrative    EXAM: CT HEAD W/O CONTRAST, CT CERVICAL SPINE W/O CONTRAST, CT FACIAL BONES WITHOUT CONTRAST  LOCATION: [facility]  DATE: 3/17/2024    INDICATION: trauma  COMPARISON: None.  TECHNIQUE:   1) Routine CT Head without IV contrast. Multiplanar reformats. Dose reduction techniques were used.  2) Routine CT Facial Bones without IV contrast. Multiplanar reformats. Dose reduction techniques were used.  3) Routine CT Cervical Spine without IV contrast. Multiplanar reformats. Dose reduction techniques were used.    FINDINGS:  HEAD CT:   INTRACRANIAL CONTENTS: No intracranial hemorrhage, extraaxial collection, or mass effect.  No CT evidence of acute infarct. Encephalitis is present in the left frontal lobe. Moderate presumed chronic small vessel ischemic changes. Mild to moderate   generalized volume loss. No hydrocephalus.     OSSEOUS STRUCTURES/SOFT TISSUES: Forehead soft tissue hematoma and laceration. The calvarium is intact.     FACIAL BONE CT:  OSSEOUS STRUCTURES/SOFT TISSUES: Soft tissue hematomas present over the forehead with small subcutaneous emphysema suggestive of laceration. Additional soft  tissue hematoma along the nose and right cheek. Comminuted, mildly displaced and depressed right   nasal bone fracture. Fracture is depressed by approximately 3 mm. Mildly depressed fracture of the anterior right and left nasal bones along the nasal bridge. Mildly displaced fracture of the anterior nasal septum. Multifocal dental decay. A small   vertical lucency is associated with the posterior most left mandibular molar    ORBITAL CONTENTS: Prior bilateral cataract surgery. Visualized portions of the orbits are otherwise unremarkable.    SINUSES: No paranasal sinus mucosal disease.    CERVICAL SPINE CT:   VERTEBRA: Normal vertebral body heights and alignment. No fracture or posttraumatic subluxation.     CANAL/FORAMINA: No high-grade spinal stenosis. Osseous foraminal stenosis is severe at C4-C5 and C5-C6.    PARASPINAL: No extraspinal abnormality. Visualized lung fields are clear.      Impression    IMPRESSION:  HEAD CT:  1.  No acute intracranial process.  2.  Chronic changes as above.    FACIAL BONE CT:  1.  Comminuted, mildly displaced and depressed fracture of the right nasal bone.  2.  Mildly depressed fracture of the anterior right and left nasal bones along the nasal bridge.  3.  Mildly displaced fracture of the anterior nasal septum.    CERVICAL SPINE CT:  1.  No fracture or posttraumatic subluxation.   CT Facial Bones without Contrast    Narrative    EXAM: CT HEAD W/O CONTRAST, CT CERVICAL SPINE W/O CONTRAST, CT FACIAL BONES WITHOUT CONTRAST  LOCATION: [facility]  DATE: 3/17/2024    INDICATION: trauma  COMPARISON: None.  TECHNIQUE:   1) Routine CT Head without IV contrast. Multiplanar reformats. Dose reduction techniques were used.  2) Routine CT Facial Bones without IV contrast. Multiplanar reformats. Dose reduction techniques were used.  3) Routine CT Cervical Spine without IV contrast. Multiplanar reformats. Dose reduction techniques were used.    FINDINGS:  HEAD CT:    INTRACRANIAL CONTENTS: No intracranial hemorrhage, extraaxial collection, or mass effect.  No CT evidence of acute infarct. Encephalitis is present in the left frontal lobe. Moderate presumed chronic small vessel ischemic changes. Mild to moderate   generalized volume loss. No hydrocephalus.     OSSEOUS STRUCTURES/SOFT TISSUES: Forehead soft tissue hematoma and laceration. The calvarium is intact.     FACIAL BONE CT:  OSSEOUS STRUCTURES/SOFT TISSUES: Soft tissue hematomas present over the forehead with small subcutaneous emphysema suggestive of laceration. Additional soft tissue hematoma along the nose and right cheek. Comminuted, mildly displaced and depressed right   nasal bone fracture. Fracture is depressed by approximately 3 mm. Mildly depressed fracture of the anterior right and left nasal bones along the nasal bridge. Mildly displaced fracture of the anterior nasal septum. Multifocal dental decay. A small   vertical lucency is associated with the posterior most left mandibular molar    ORBITAL CONTENTS: Prior bilateral cataract surgery. Visualized portions of the orbits are otherwise unremarkable.    SINUSES: No paranasal sinus mucosal disease.    CERVICAL SPINE CT:   VERTEBRA: Normal vertebral body heights and alignment. No fracture or posttraumatic subluxation.     CANAL/FORAMINA: No high-grade spinal stenosis. Osseous foraminal stenosis is severe at C4-C5 and C5-C6.    PARASPINAL: No extraspinal abnormality. Visualized lung fields are clear.      Impression    IMPRESSION:  HEAD CT:  1.  No acute intracranial process.  2.  Chronic changes as above.    FACIAL BONE CT:  1.  Comminuted, mildly displaced and depressed fracture of the right nasal bone.  2.  Mildly depressed fracture of the anterior right and left nasal bones along the nasal bridge.  3.  Mildly displaced fracture of the anterior nasal septum.    CERVICAL SPINE CT:  1.  No fracture or posttraumatic subluxation.   CT Cervical Spine w/o Contrast     Narrative    EXAM: CT HEAD W/O CONTRAST, CT CERVICAL SPINE W/O CONTRAST, CT FACIAL BONES WITHOUT CONTRAST  LOCATION: [facility]  DATE: 3/17/2024    INDICATION: trauma  COMPARISON: None.  TECHNIQUE:   1) Routine CT Head without IV contrast. Multiplanar reformats. Dose reduction techniques were used.  2) Routine CT Facial Bones without IV contrast. Multiplanar reformats. Dose reduction techniques were used.  3) Routine CT Cervical Spine without IV contrast. Multiplanar reformats. Dose reduction techniques were used.    FINDINGS:  HEAD CT:   INTRACRANIAL CONTENTS: No intracranial hemorrhage, extraaxial collection, or mass effect.  No CT evidence of acute infarct. Encephalitis is present in the left frontal lobe. Moderate presumed chronic small vessel ischemic changes. Mild to moderate   generalized volume loss. No hydrocephalus.     OSSEOUS STRUCTURES/SOFT TISSUES: Forehead soft tissue hematoma and laceration. The calvarium is intact.     FACIAL BONE CT:  OSSEOUS STRUCTURES/SOFT TISSUES: Soft tissue hematomas present over the forehead with small subcutaneous emphysema suggestive of laceration. Additional soft tissue hematoma along the nose and right cheek. Comminuted, mildly displaced and depressed right   nasal bone fracture. Fracture is depressed by approximately 3 mm. Mildly depressed fracture of the anterior right and left nasal bones along the nasal bridge. Mildly displaced fracture of the anterior nasal septum. Multifocal dental decay. A small   vertical lucency is associated with the posterior most left mandibular molar    ORBITAL CONTENTS: Prior bilateral cataract surgery. Visualized portions of the orbits are otherwise unremarkable.    SINUSES: No paranasal sinus mucosal disease.    CERVICAL SPINE CT:   VERTEBRA: Normal vertebral body heights and alignment. No fracture or posttraumatic subluxation.     CANAL/FORAMINA: No high-grade spinal stenosis. Osseous foraminal stenosis is  severe at C4-C5 and C5-C6.    PARASPINAL: No extraspinal abnormality. Visualized lung fields are clear.      Impression    IMPRESSION:  HEAD CT:  1.  No acute intracranial process.  2.  Chronic changes as above.    FACIAL BONE CT:  1.  Comminuted, mildly displaced and depressed fracture of the right nasal bone.  2.  Mildly depressed fracture of the anterior right and left nasal bones along the nasal bridge.  3.  Mildly displaced fracture of the anterior nasal septum.    CERVICAL SPINE CT:  1.  No fracture or posttraumatic subluxation.   Humerus XR, G/E 2 views, right    Narrative    EXAM: XR HUMERUS RIGHT G/E 2 VIEWS, XR SHOULDER RIGHT 2 VIEWS  LOCATION: [facility]  DATE: 3/17/2024    INDICATION: Right arm pain after injury.  COMPARISON: None.      Impression    IMPRESSION: Acute proximal humeral fracture. Mildly displaced fracture of the posterior greater tuberosity. Additional deformity and heterogeneous lucency across the surgical neck of the proximal humerus, almost certainly represents additional fracture   in this location; evaluation is somewhat limited due to internal rotation of the humerus on each of the views. CT could further evaluate and provide treatment planning, if clinically appropriate. Glenohumeral and acromial clavicular joint alignment is   normal. Elbow joint alignment is normal.   XR Chest Port 1 View    Narrative    EXAM: XR CHEST PORT 1 VIEW  LOCATION: [facility]  DATE: 3/17/2024    INDICATION: syncope and fall  COMPARISON: None.      Impression    IMPRESSION: Extensive consolidation in the left mid and lower lung, which may represent pneumonia versus pulmonary contusions/atelectasis. Possible trace left effusion. No pneumothorax. Heart size and mediastinal contours are within normal limits where   seen. Aortic arch calcifications. Unchanged comminuted proximal right humerus fracture.   XR Shoulder Right 2 Views    Narrative    EXAM: XR  HUMERUS RIGHT G/E 2 VIEWS, XR SHOULDER RIGHT 2 VIEWS  LOCATION: [facility]  DATE: 3/17/2024    INDICATION: Right arm pain after injury.  COMPARISON: None.      Impression    IMPRESSION: Acute proximal humeral fracture. Mildly displaced fracture of the posterior greater tuberosity. Additional deformity and heterogeneous lucency across the surgical neck of the proximal humerus, almost certainly represents additional fracture   in this location; evaluation is somewhat limited due to internal rotation of the humerus on each of the views. CT could further evaluate and provide treatment planning, if clinically appropriate. Glenohumeral and acromial clavicular joint alignment is   normal. Elbow joint alignment is normal.

## 2024-03-17 NOTE — ED PROVIDER NOTES
EMERGENCY DEPARTMENT ENCOUNTER      NAME: Vale Solares  AGE: 89 year old female  YOB: 1934  MRN: [id number]  EVALUATION DATE & TIME: 3/17/2024 12:18 PM    PCP: Rober Holloway    ED PROVIDER: Amy Riley M.D.      CHIEF COMPLAINT     Chief Complaint   Patient presents with    Fall    Shoulder Injury    Head Injury         FINAL IMPRESSION:     1. Syncope and collapse    2. Closed fracture of nasal bone, initial encounter    3. Closed displaced fracture of surgical neck of right humerus, unspecified fracture morphology, initial encounter    4. History of hypertension    5. History of coronary artery disease          MEDICAL DECISION MAKING:       Pertinent Labs & Imaging studies reviewed. (See chart for details)    89 year old female presents to the Emergency Department for evaluation of syncope    History  Supplemental history from EMS  External Record(s) review as documented below    Exam  Facial trauma.  No hyphema no hemotympanum no midline cervical thoracic lumbar transfer patient tenderness palpation right shoulder.  No other extremity pain. GCS of 15    Differential Diagnosis include but not limited to arrhythmia accidental trauma head injury facial trauma fracture among others.      Vital Signs: Hypertension  EKG: Sinus rhythm.  Normal anterior progression left axis deviation first-degree AV block LVH  Imaging: I independently interpreted the head no intracranial hemorrhage.Formal read by radiologist.  Home meds: reviewed  ED meds: dilaudid morphine  Fluids:  Labs:  K 4.1  Cr 0.91  Wbc 10.6  Hgb 12.9  platelets 327    Clinical Impression and Decision Making    89-year-old female presents status post syncopal episode while leaving [employer].  Per EMS facial trauma not anticoagulated first-degree AV block.    Patient states she drank coffee normally she has oatmeal.  Does not know if she tripped.  Does not think she lost consciousness.  Has been doing well otherwise.    Per EMS patient  reported that she felt dizzy and collapsed and was unconscious for 30 seconds.    Arrival patient is awake alert oriented x 3 obvious contusion and abrasions to the right forehead on the nose no septal hematoma.    Pain with palpation of the right shoulder no elbow or wrist or forearm or finger tenderness palpation.    No midline cervical thoracic lumbar tenderness palpation.    Established patient given Dilaudid for pain.  Trauma evaluation included a CT head, CT C-spine, CT face.  No acute intracranial hemorrhage fracture.  Soft tissue contusion.    Shoulder x-ray reveals humeral head fracture.  Humerus is normal.    Wound cleaned.  Dermabond placed. Ex  Daughter in law at bedside.      In addition to the work out documented, I considered the following work up antibiotics.  Chest x-ray reveals a possible consolidation on the left.  She does have rhonchi.  No hypoxia.  Consider contusion patient she is has no chest wall tenderness palpation no ecchymosis.  She states otherwise she has been fine without any recent upper respiratory infections.           Medical Decision Making    History:  Supplemental history from: Documented in chart and EMS  External Record(s) reviewed: Documented in chart    Work Up:  Chart documentation includes differential considered and any EKGs or imaging independently interpreted by provider, where specified.  In additional to work up documented, I considered the following work up: Documented in chart, if applicable.    External consultation:  Discussion of management with another provider: Documented in chart, if applicable    Complicating factors:  Care impacted by chronic illness: Dementia and Hypertension  Care affected by social determinants of health: N/A    Disposition considerations:       Review of External Records  Hospital/Clinic: [facility]  Date: 2/2/2023  Vitamin D deficiency  Hypertension  Hyponatremia  Coronary disease    External Consultation  Dr. Darren Garland  Hospitalist,  Dr. Barajas      ED COURSE     12:15 PM met with patient and obtain a history.  12:47 PM re evaluated  1:39 PM reevaluated and updated  2:32 PM  ex daughter in law at bedside  With Ortho who was able to review x-rays.  Recommends sling and pain control.  Spoke with hospitalist was asked patient MedSurg telemetry observation.  3:15 PM reevaluated and updated.  Still having 9 out of 10 pain.  Will try morphine.    At the conclusion of the encounter I discussed the results of all of the tests and the disposition. The questions were answered. The patient acknowledged understanding and was agreeable with the care plan.         MEDICATIONS GIVEN IN THE EMERGENCY:     Medications   morphine (PF) injection 4 mg (has no administration in time range)   HYDROmorphone (PF) (DILAUDID) injection 0.3 mg (0.3 mg Intravenous $Given 3/17/24 1245)   sodium chloride 0.9% BOLUS 250 mL (0 mLs Intravenous Stopped 3/17/24 1512)   HYDROmorphone (PF) (DILAUDID) injection 0.5 mg (0.5 mg Intravenous $Given 3/17/24 1346)       NEW PRESCRIPTIONS STARTED AT TODAY'S ER VISIT     New Prescriptions    No medications on file          =================================================================    HPI     Patient information was obtained from: Patient and EMS    Use of : N/A       Vale LASHANDA Solares is a 89 year old female who presents by ambulance, alone.    Per EMS, patient was coming out of [facility] when she felt dizzy and fell. She landed on her face and right shoulder.  Per paramedics patient was unconscious for 30 seconds.    Patient states she was feeling fine this morning and went to [facility] as normal.  As she was leaving [facility] she tripped and fell, but is unsure if she was dizzy. She landed on her face and denies losing consciousness after the fall.    She is currently having right shoulder pain and face pain. States her left side extremities are feeling fine.     Denies lightheadedness, neck pain, chest pain, abdominal  "pain, pain in her left extremities, vomiting, or diarrhea.     she has been doing well otherwise no recent illnesses.    She does not take any prescription medications.     2:18 PM ex daughter-in-law at bedside.  States patient lives in assisted living mostly because of her  who is now .  Patient does not use a walker.  She is oriented and at baseline.      REVIEW OF SYSTEMS   Review of Systems   HENT:  Positive for facial swelling.         Pain and bruising on face   Musculoskeletal:         Right shoulder pain   All other systems reviewed and are negative.      PAST MEDICAL HISTORY:   No past medical history on file.    PAST SURGICAL HISTORY:   No past surgical history on file.      CURRENT MEDICATIONS:   No current outpatient medications on file.       ALLERGIES:   Not on File    FAMILY HISTORY:   No family history on file.    SOCIAL HISTORY:     Social History     Socioeconomic History    Marital status:        VITALS:   BP (!) 162/69   Pulse 56   Temp 97.9  F (36.6  C) (Oral)   Resp 16   Ht 1.549 m (5' 1\")   Wt 61.2 kg (135 lb)   SpO2 97%   BMI 25.51 kg/m      PHYSICAL EXAM     Physical Exam  Vitals and nursing note reviewed. Exam conducted with a chaperone present.   Constitutional:       General: She is not in acute distress.     Appearance: She is obese. She is not ill-appearing, toxic-appearing or diaphoretic.   HENT:      Head:        Comments: Contusions with superficial abrasions status post Dermabond.     Right Ear: Tympanic membrane normal.      Left Ear: Tympanic membrane normal.      Nose: Signs of injury and nasal tenderness present. No nasal deformity or septal deviation.      Right Nostril: No epistaxis or septal hematoma.      Left Nostril: No epistaxis or septal hematoma.        Comments: Perfusion abrasion.  No septal hematoma.  Contusion.  No hyphema.  No hemotympanum.  Cardiovascular:      Rate and Rhythm: Normal rate.   Pulmonary:      Breath sounds: Rhonchi " present.   Chest:          Comments: No chest wall tenderness palpation.  Abdominal:          Comments: Surgical scars.   Neurological:      Mental Status: She is alert.             LAB:     All pertinent labs reviewed and interpreted.  Labs Ordered and Resulted from Time of ED Arrival to Time of ED Departure   BASIC METABOLIC PANEL - Abnormal       Result Value    Sodium 133 (*)     Potassium 4.1      Chloride 99      Carbon Dioxide (CO2) 20 (*)     Anion Gap 14      Urea Nitrogen 12.5      Creatinine 0.91      GFR Estimate 60 (*)     Calcium 9.8      Glucose 148 (*)    TSH WITH FREE T4 REFLEX - Abnormal    TSH 6.39 (*)    TROPONIN T, HIGH SENSITIVITY - Abnormal    Troponin T, High Sensitivity 16 (*)    MAGNESIUM - Normal    Magnesium 2.2     T4 FREE - Normal    Free T4 1.31     CBC WITH PLATELETS AND DIFFERENTIAL    WBC Count 10.6      RBC Count 4.63      Hemoglobin 12.9      Hematocrit 39.3      MCV 85      MCH 27.9      MCHC 32.8      RDW 14.6      Platelet Count 327      % Neutrophils 55      % Lymphocytes 35      % Monocytes 7      % Eosinophils 1      % Basophils 1      % Immature Granulocytes 1      NRBCs per 100 WBC 0      Absolute Neutrophils 5.9      Absolute Lymphocytes 3.7      Absolute Monocytes 0.7      Absolute Eosinophils 0.2      Absolute Basophils 0.1      Absolute Immature Granulocytes 0.1      Absolute NRBCs 0.0          RADIOLOGY:     Reviewed all pertinent imaging. Please see official radiology report.  XR Shoulder Right 2 Views   Final Result   IMPRESSION: Acute proximal humeral fracture. Mildly displaced fracture of the posterior greater tuberosity. Additional deformity and heterogeneous lucency across the surgical neck of the proximal humerus, almost certainly represents additional fracture    in this location; evaluation is somewhat limited due to internal rotation of the humerus on each of the views. CT could further evaluate and provide treatment planning, if clinically appropriate.  Glenohumeral and acromial clavicular joint alignment is    normal. Elbow joint alignment is normal.      XR Chest Port 1 View   Final Result   IMPRESSION: Extensive consolidation in the left mid and lower lung, which may represent pneumonia versus pulmonary contusions/atelectasis. Possible trace left effusion. No pneumothorax. Heart size and mediastinal contours are within normal limits where    seen. Aortic arch calcifications. Unchanged comminuted proximal right humerus fracture.      Humerus XR, G/E 2 views, right   Final Result   IMPRESSION: Acute proximal humeral fracture. Mildly displaced fracture of the posterior greater tuberosity. Additional deformity and heterogeneous lucency across the surgical neck of the proximal humerus, almost certainly represents additional fracture    in this location; evaluation is somewhat limited due to internal rotation of the humerus on each of the views. CT could further evaluate and provide treatment planning, if clinically appropriate. Glenohumeral and acromial clavicular joint alignment is    normal. Elbow joint alignment is normal.      CT Cervical Spine w/o Contrast   Final Result   IMPRESSION:   HEAD CT:   1.  No acute intracranial process.   2.  Chronic changes as above.      FACIAL BONE CT:   1.  Comminuted, mildly displaced and depressed fracture of the right nasal bone.   2.  Mildly depressed fracture of the anterior right and left nasal bones along the nasal bridge.   3.  Mildly displaced fracture of the anterior nasal septum.      CERVICAL SPINE CT:   1.  No fracture or posttraumatic subluxation.      CT Facial Bones without Contrast   Final Result   IMPRESSION:   HEAD CT:   1.  No acute intracranial process.   2.  Chronic changes as above.      FACIAL BONE CT:   1.  Comminuted, mildly displaced and depressed fracture of the right nasal bone.   2.  Mildly depressed fracture of the anterior right and left nasal bones along the nasal bridge.   3.  Mildly displaced  fracture of the anterior nasal septum.      CERVICAL SPINE CT:   1.  No fracture or posttraumatic subluxation.      Head CT w/o contrast   Final Result   IMPRESSION:   HEAD CT:   1.  No acute intracranial process.   2.  Chronic changes as above.      FACIAL BONE CT:   1.  Comminuted, mildly displaced and depressed fracture of the right nasal bone.   2.  Mildly depressed fracture of the anterior right and left nasal bones along the nasal bridge.   3.  Mildly displaced fracture of the anterior nasal septum.      CERVICAL SPINE CT:   1.  No fracture or posttraumatic subluxation.           EKG:     EKG #1  Sinus rhythm first-degree AV block poor anterior progression left axis deviation LVH    Time:[id number]    Ventricular rate 62 bmp  [place] LAD  AR interval 230 ms  QRS duration 116 ms  QT//430 ms    Compared to previous EKG on no previous available for comparison.  I have independently reviewed and interpreted the EKG(s) documented above.      PROCEDURES:     Procedures      I, Lyubov Baig, am serving as a scribe to document services personally performed by Dr. Riley based on my observation and the provider's statements to me. I, Amy Riley MD attest that Lyubov Baig is acting in a scribe capacity, has observed my performance of the services and has documented them in accordance with my direction.    Amy Riley M.D.  Emergency Medicine  [facility] EMERGENCY DEPARTMENT  [facility] [facility] [address]  [phone]  Dept: [phone]       Amy Riley MD  03/17/24 0349

## 2024-03-18 ENCOUNTER — APPOINTMENT (OUTPATIENT)
Dept: CARDIOLOGY | Facility: HOSPITAL | Age: 89
End: 2024-03-18
Attending: STUDENT IN AN ORGANIZED HEALTH CARE EDUCATION/TRAINING PROGRAM
Payer: COMMERCIAL

## 2024-03-18 ENCOUNTER — APPOINTMENT (OUTPATIENT)
Dept: PHYSICAL THERAPY | Facility: HOSPITAL | Age: 89
End: 2024-03-18
Attending: STUDENT IN AN ORGANIZED HEALTH CARE EDUCATION/TRAINING PROGRAM
Payer: COMMERCIAL

## 2024-03-18 ENCOUNTER — APPOINTMENT (OUTPATIENT)
Dept: CT IMAGING | Facility: HOSPITAL | Age: 89
End: 2024-03-18
Payer: COMMERCIAL

## 2024-03-18 ENCOUNTER — APPOINTMENT (OUTPATIENT)
Dept: OCCUPATIONAL THERAPY | Facility: HOSPITAL | Age: 89
End: 2024-03-18
Attending: STUDENT IN AN ORGANIZED HEALTH CARE EDUCATION/TRAINING PROGRAM
Payer: COMMERCIAL

## 2024-03-18 LAB
ALBUMIN SERPL BCG-MCNC: 3.3 G/DL (ref 3.5–5.2)
ALP SERPL-CCNC: 78 U/L (ref 40–150)
ALT SERPL W P-5'-P-CCNC: 21 U/L (ref 0–50)
ANION GAP SERPL CALCULATED.3IONS-SCNC: 8 MMOL/L (ref 7–15)
AST SERPL W P-5'-P-CCNC: 25 U/L (ref 0–45)
ATRIAL RATE - MUSE: 62 BPM
BILIRUB DIRECT SERPL-MCNC: <0.2 MG/DL (ref 0–0.3)
BILIRUB SERPL-MCNC: 0.4 MG/DL
BUN SERPL-MCNC: 12.7 MG/DL (ref 8–23)
CALCIUM SERPL-MCNC: 9.1 MG/DL (ref 8.8–10.2)
CHLORIDE SERPL-SCNC: 100 MMOL/L (ref 98–107)
CREAT SERPL-MCNC: 0.84 MG/DL (ref 0.51–0.95)
DEPRECATED HCO3 PLAS-SCNC: 25 MMOL/L (ref 22–29)
DIASTOLIC BLOOD PRESSURE - MUSE: NORMAL MMHG
EGFRCR SERPLBLD CKD-EPI 2021: 66 ML/MIN/1.73M2
ERYTHROCYTE [DISTWIDTH] IN BLOOD BY AUTOMATED COUNT: 15.1 % (ref 10–15)
FLUAV RNA SPEC QL NAA+PROBE: NEGATIVE
FLUBV RNA RESP QL NAA+PROBE: NEGATIVE
GLUCOSE SERPL-MCNC: 107 MG/DL (ref 70–99)
HCT VFR BLD AUTO: 35.8 % (ref 35–47)
HGB BLD-MCNC: 11.7 G/DL (ref 11.7–15.7)
INTERPRETATION ECG - MUSE: NORMAL
LVEF ECHO: NORMAL
MCH RBC QN AUTO: 28.1 PG (ref 26.5–33)
MCHC RBC AUTO-ENTMCNC: 32.7 G/DL (ref 31.5–36.5)
MCV RBC AUTO: 86 FL (ref 78–100)
P AXIS - MUSE: 20 DEGREES
PLATELET # BLD AUTO: 295 10E3/UL (ref 150–450)
POTASSIUM SERPL-SCNC: 4.1 MMOL/L (ref 3.4–5.3)
PR INTERVAL - MUSE: 230 MS
PROT SERPL-MCNC: 6.5 G/DL (ref 6.4–8.3)
QRS DURATION - MUSE: 116 MS
QT - MUSE: 424 MS
QTC - MUSE: 430 MS
R AXIS - MUSE: -46 DEGREES
RBC # BLD AUTO: 4.17 10E6/UL (ref 3.8–5.2)
RSV RNA SPEC NAA+PROBE: NEGATIVE
SARS-COV-2 RNA RESP QL NAA+PROBE: NEGATIVE
SODIUM SERPL-SCNC: 133 MMOL/L (ref 135–145)
SYSTOLIC BLOOD PRESSURE - MUSE: NORMAL MMHG
T AXIS - MUSE: 60 DEGREES
VENTRICULAR RATE- MUSE: 62 BPM
WBC # BLD AUTO: 11.5 10E3/UL (ref 4–11)

## 2024-03-18 PROCEDURE — 93306 TTE W/DOPPLER COMPLETE: CPT | Mod: 26 | Performed by: INTERNAL MEDICINE

## 2024-03-18 PROCEDURE — 250N000011 HC RX IP 250 OP 636: Performed by: STUDENT IN AN ORGANIZED HEALTH CARE EDUCATION/TRAINING PROGRAM

## 2024-03-18 PROCEDURE — 250N000013 HC RX MED GY IP 250 OP 250 PS 637

## 2024-03-18 PROCEDURE — G0378 HOSPITAL OBSERVATION PER HR: HCPCS

## 2024-03-18 PROCEDURE — 250N000013 HC RX MED GY IP 250 OP 250 PS 637: Performed by: STUDENT IN AN ORGANIZED HEALTH CARE EDUCATION/TRAINING PROGRAM

## 2024-03-18 PROCEDURE — 999N000248 HC STATISTIC IV INSERT WITH US BY RN

## 2024-03-18 PROCEDURE — 87637 SARSCOV2&INF A&B&RSV AMP PRB: CPT

## 2024-03-18 PROCEDURE — 97166 OT EVAL MOD COMPLEX 45 MIN: CPT | Mod: GO

## 2024-03-18 PROCEDURE — 84450 TRANSFERASE (AST) (SGOT): CPT | Performed by: STUDENT IN AN ORGANIZED HEALTH CARE EDUCATION/TRAINING PROGRAM

## 2024-03-18 PROCEDURE — 71260 CT THORAX DX C+: CPT

## 2024-03-18 PROCEDURE — 82374 ASSAY BLOOD CARBON DIOXIDE: CPT | Performed by: STUDENT IN AN ORGANIZED HEALTH CARE EDUCATION/TRAINING PROGRAM

## 2024-03-18 PROCEDURE — 97116 GAIT TRAINING THERAPY: CPT | Mod: GP

## 2024-03-18 PROCEDURE — 99207 PR APP CREDIT; MD BILLING SHARED VISIT: CPT

## 2024-03-18 PROCEDURE — 99222 1ST HOSP IP/OBS MODERATE 55: CPT | Performed by: INTERNAL MEDICINE

## 2024-03-18 PROCEDURE — 85027 COMPLETE CBC AUTOMATED: CPT | Performed by: STUDENT IN AN ORGANIZED HEALTH CARE EDUCATION/TRAINING PROGRAM

## 2024-03-18 PROCEDURE — 93306 TTE W/DOPPLER COMPLETE: CPT

## 2024-03-18 PROCEDURE — 36415 COLL VENOUS BLD VENIPUNCTURE: CPT | Performed by: STUDENT IN AN ORGANIZED HEALTH CARE EDUCATION/TRAINING PROGRAM

## 2024-03-18 PROCEDURE — 99233 SBSQ HOSP IP/OBS HIGH 50: CPT | Mod: FS | Performed by: INTERNAL MEDICINE

## 2024-03-18 PROCEDURE — 97162 PT EVAL MOD COMPLEX 30 MIN: CPT | Mod: GP

## 2024-03-18 PROCEDURE — 82565 ASSAY OF CREATININE: CPT | Performed by: STUDENT IN AN ORGANIZED HEALTH CARE EDUCATION/TRAINING PROGRAM

## 2024-03-18 PROCEDURE — 97535 SELF CARE MNGMENT TRAINING: CPT | Mod: GO

## 2024-03-18 PROCEDURE — 250N000013 HC RX MED GY IP 250 OP 250 PS 637: Performed by: INTERNAL MEDICINE

## 2024-03-18 PROCEDURE — 250N000011 HC RX IP 250 OP 636: Performed by: INTERNAL MEDICINE

## 2024-03-18 RX ORDER — ACETAMINOPHEN 325 MG/1
975 TABLET ORAL EVERY 8 HOURS
Status: DISCONTINUED | OUTPATIENT
Start: 2024-03-18 | End: 2024-03-19 | Stop reason: HOSPADM

## 2024-03-18 RX ORDER — IOPAMIDOL 755 MG/ML
75 INJECTION, SOLUTION INTRAVASCULAR ONCE
Status: COMPLETED | OUTPATIENT
Start: 2024-03-18 | End: 2024-03-18

## 2024-03-18 RX ORDER — AMLODIPINE BESYLATE 2.5 MG/1
2.5 TABLET ORAL DAILY
Status: DISCONTINUED | OUTPATIENT
Start: 2024-03-18 | End: 2024-03-19 | Stop reason: HOSPADM

## 2024-03-18 RX ADMIN — OXYCODONE HYDROCHLORIDE 5 MG: 5 TABLET ORAL at 22:06

## 2024-03-18 RX ADMIN — ACETAMINOPHEN 975 MG: 325 TABLET ORAL at 15:01

## 2024-03-18 RX ADMIN — ACETAMINOPHEN 325 MG: 325 TABLET ORAL at 00:30

## 2024-03-18 RX ADMIN — IOPAMIDOL 75 ML: 755 INJECTION, SOLUTION INTRAVENOUS at 10:35

## 2024-03-18 RX ADMIN — OXYCODONE HYDROCHLORIDE 5 MG: 5 TABLET ORAL at 14:12

## 2024-03-18 RX ADMIN — ACETAMINOPHEN 975 MG: 325 TABLET ORAL at 07:40

## 2024-03-18 RX ADMIN — OXYCODONE HYDROCHLORIDE 5 MG: 5 TABLET ORAL at 09:55

## 2024-03-18 RX ADMIN — AMLODIPINE BESYLATE 2.5 MG: 2.5 TABLET ORAL at 16:39

## 2024-03-18 RX ADMIN — OXYCODONE HYDROCHLORIDE 5 MG: 5 TABLET ORAL at 02:05

## 2024-03-18 ASSESSMENT — ACTIVITIES OF DAILY LIVING (ADL)
ADLS_ACUITY_SCORE: 44
ADLS_ACUITY_SCORE: 42
ADLS_ACUITY_SCORE: 42
ADLS_ACUITY_SCORE: 36
ADLS_ACUITY_SCORE: 36
ADLS_ACUITY_SCORE: 42
ADLS_ACUITY_SCORE: 44
ADLS_ACUITY_SCORE: 44
ADLS_ACUITY_SCORE: 42
ADLS_ACUITY_SCORE: 44
ADLS_ACUITY_SCORE: 42
ADLS_ACUITY_SCORE: 44
ADLS_ACUITY_SCORE: 42
ADLS_ACUITY_SCORE: 44
ADLS_ACUITY_SCORE: 44

## 2024-03-18 NOTE — PLAN OF CARE
Acute Traumatic Pain     1.  Pain controlled with oral analgesia: Yes      2.  Vital signs stable: Yes      3.  Diagnotic testing complete: No      4.  Cleared from consultants (if applicable): No      5. Return to near baseline physical activity: No    Goal Outcome Evaluation:  Patient complaining of 8/10 right shoulder pain with activity.  Taking tylenol and oxycodone for pain relief.  Echo completed.  Remains NPO pending ortho recommendations.  Ambulated in hallway with assist x1 with walker and gaitbelt.

## 2024-03-18 NOTE — PROGRESS NOTES
"PRIMARY DIAGNOSIS: \"GENERIC\" NURSING  OUTPATIENT/OBSERVATION GOALS TO BE MET BEFORE DISCHARGE:  ADLs back to baseline: No    Activity and level of assistance: Assist x 2    Pain status: Improved but still requiring IV narcotics.    Return to near baseline physical activity: No     Discharge Planner Nurse   Safe discharge environment identified: No  Barriers to discharge: Yes       Entered by: Kahlil Diaz RN 03/17/2024 10:58 PM     A & O x 4, pleasant. VSS except hypertensive on RA. Pain addressed with PRN medication and cold application. Tele: sinus rhythm w/ BBB. L PIV SL. NPO except for meds/ice chips. Assist x 2, ambulated to bathroom x 1 and up to commode x 1. Purewick in place for overnight. Family stopped by for support. Nursing continue to monitor.   "

## 2024-03-18 NOTE — UTILIZATION REVIEW
Concurrent stay review; Secondary Review Determination     Under the authority of the Utilization Management Committee, the utilization review process indicated a secondary review on Vale Solares.  The review outcome is based on review of the medical records, discussions with staff, and applying clinical experience noted on the date of the review.        (x) Observation Status Appropriate - Concurrent stay review    RATIONALE FOR DETERMINATION   Vale Solares is an 89 yr old female with HTN, chronic hyponatremia, CAD who presented with fall at [facility] and arm pain.  Identified to have right humeral fracture.  Conservative management per ortho.  Possible syncope but evaluation thus far without significant findings.  Concern for left lung consolidation however on CT, no pulmonary infiltrate.  IV abx stopped.  Anticipate ability to discharge soon pending safe disposition.  Discussed with Dr. Bailey.  At this time cardiac concern for syncope.  He will plan ongoing telemetry monitoring and cardiology consultation pending.  If cardiology feels aggressive cardiac evaluation needed prior to discharge, then consider transition to inpatient.      Patient is clinically improving and there is no clear indication to change patient's status to inpatient. The severity of illness, intensity of service provided, expected LOS and risk for adverse outcome make the care appropriate for observation.    The information on this document is developed by the utilization review team in order for the business office to ensure compliance.  This only denotes the appropriateness of proper admission status and does not reflect the quality of care rendered.         The definitions of Inpatient Status and Observation Status used in making the determination above are those provided in the CMS Coverage Manual, Chapter 1 and Chapter 6, section 70.4.      Sincerely,   Kely Rutherford MD  Utilization Review  Physician  Advisor  [facility]

## 2024-03-18 NOTE — PROVIDER NOTIFICATION
Dr. Joe Barajas notified for /74 via [employer]. Order obtained for PRN hydralazine 10 mg IV for systolic BP > 180 mmHg.

## 2024-03-18 NOTE — PROGRESS NOTES
Care Management Follow Up    Length of Stay (days): 0    Expected Discharge Date: 03/18/2024     Concerns to be Addressed: discharge planning, utilization management     Patient plan of care discussed at interdisciplinary rounds: Yes    Anticipated Discharge Disposition:  TCU vs Assisted Living Facility with increased services     Anticipated Discharge Services:  yes  Anticipated Discharge DME:  yes    Patient/family educated on Medicare website which has current facility and service quality ratings:  yes  Education Provided on the Discharge Plan:  yes  Patient/Family in Agreement with the Plan:  yes    Referrals Placed by CM/SW:    Private pay costs discussed: transportation costs    Additional Information:  Therapy recommending TCU at this time; SW left message for Son and requested call to be returned.  12:28 PM    SW met with pt and son in room to discuss therapy recommendations of TCU; agreeable and aware that [employer] contracts with limited facilities. List of choices given and referrals sent. Care management following.  1:39 PM    Pt accepted to [facility] home TCU; secured with family and facility admissions running for insurance auth (pending). Anticipate discharge tomorrow pending cards consult. Care management following.  2:30 PM    Brenna Kjellberg, BSW  3/18/2024

## 2024-03-18 NOTE — CONSULTS
CARDIOLOGY CONSULT NOTE         Assessment:   1.  Syncope and collapse-actually more of a fall, no true syncope.  Would monitor telemetry as doing.  2.  Bifascicular block-patient has a left anterior hemiblock, incomplete right bundle branch block pattern with a first-degree AV block.  Continue to monitor telemetry and echo looks unremarkable.  3.  Benign essential hypertension-continue to monitor blood pressure and adjust meds such as amlodipine slowly given her age.  4.  Closed displaced fracture of surgical neck of right humerus, unspecified fracture morphology, initial encounter-conservative therapy per orthopedics.     Plan:   1.  Continue to monitor telemetry as you are doing.  2.  Cardiology does not have much further inpatient to add, most likely will sign off tomorrow.     Current History:   [facility] has been requested by Dr. Dangelo Bailey to evaluate Vale Solares  who is a 89 year old year old white female for fall.  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  She thinks she tripped over either a rug or a chair leg, there was no syncope.  She had a nasal fracture as well as right mid mildly displaced fracture of the humerus.  Cardiology consultation was requested.  Patient denies vehemently any syncope, dizziness, chest pains, palpitations, shortness of breath, PND, with apnea or peripheral edema.    Past Medical History:   Benign Essential Hypertension     Past history is negative for cancer, tuberculosis, diabetes mellitus, myocardial infarction,  rheumatic fever, cerebrovascular accident, chronic kidney disease, peptic ulcer disease, chronic obstructive pulmonary disease, or thyroid disorder  and no lipid disorder.    Past Surgical History:   No past surgical history on file. She  has no past surgical history on file.    Family History:   Reviewed, and negative for coronary artery  "disease.    Social History:   Reviewed, and she is , lives in senior housing independently, denies any tobacco use or alcohol use.    Meds:      acetaminophen  975 mg Oral Q8H    [Held by provider] amLODIPine  5 mg Oral Daily       Allergies:   Patient has no known allergies.    Review of Systems:   A 12 point comprehensive review of systems was  as follows:   General: negative for fatigue, weight loss or weight gain  Constitutional: negative for anorexia, chills, fevers, malaise, night sweats   Eyes: negative for cataracts, color blindness, contacts/glasses, glaucoma, icterus, irritation, redness and visual disturbance  Ears, nose, mouth, throat, and face: negative for ear drainage, earaches, epistaxis, facial trauma, hearing loss, hoarseness, nasal congestion, snoring, sore mouth, sore throat, tinnitus and voice change  Respiratory: negative for cough, dyspnea on exertion,  hemoptysis, sputum production, pleurisy, stridor, wheezing, PND, orthopnea  Cardiovascular: negative for chest pain, chest pressure/discomfort, claudication, dyspnea, exertional chest pressure/discomfort, fatigue, irregular heart beat, lower extremity edema, near-syncope,  palpitations,  syncope   Gastrointestinal: negative for abdominal pain, change in bowel haibits, constipation, diarrhea, dyspepsia, dysphagia, jaundice, melena, nausea, odynophagia, reflux symptoms and vomiting  Genitourinary: negative for decreased stream  Hematologic/lymphatic: negative for bleeding  Musculoskeletal: negative for arthralgias, back pain, bone pain, muscle weakness, myalgias, neck pain and stiff joints  Neurological: negative for syncope, presyncope, coordination problems, dizziness, gait problems, headaches, memory problems, paresthesia, seizures, speech problems, tremors, vertigo and weakness    Objective:     Physical Exam:  BP (!) 156/68 (BP Location: Left arm)   Pulse 61   Temp 97.7  F (36.5  C) (Oral)   Resp 17   Ht 1.549 m (5' 1\")   Wt 61.2 " "kg (135 lb)   SpO2 98%   BMI 25.51 kg/m       Head:    Normocephalic, without obvious abnormality, atraumatic   Eyes:    PERRL, conjunctiva/corneas clear, EOM's intact,           Nose:   Nares normal, septum midline, mucosa normal, no drainage  or sinus tenderness   Throat:   Lips, mucosa, and tongue normal; teeth and gums normal   Neck:   Supple, symmetrical, trachea midline, no adenopathy;        thyroid:  No enlargement/tenderness/nodules; no carotid    bruit or JVD   Back:     Symmetric, no curvature, ROM normal, no CVA tenderness   Lungs:     Clear to auscultation bilaterally, respirations unlabored   Chest wall:    No tenderness or deformity   Heart:    Regular rate and rhythm, S1 and S2 normal, no murmur, rub   or gallop   Abdomen:     Soft, non-tender, bowel sounds active all four quadrants,     no masses, no organomegaly   Extremities:   Extremities normal, atraumatic, no cyanosis or edema   Pulses:   2+ and symmetric all extremities   Skin:   Skin color, texture, turgor normal, no rashes or lesions   Neurologic:   CNII-XII intact. Normal strength, sensation and reflexes       throughout     Cardiographics and Imaging  Radiology Results: Chest x-ray shows   1.  No acute pulmonary findings. No pneumothorax or pulmonary contusion. No pneumonia, pleural effusion or hemothorax.  2.  Mild to moderate nonspecific pulmonary fibrotic changes.  3.  Acute comminuted right proximal humerus fracture.    EKG Results: personally reviewed sinus rhythm, first-degree AV block, left anterior hemiblock, incomplete right bundle branch block pattern.      Lab Review   Pertinent Labs  Lab Results: personally reviewed       No results found for: \"CKTOTAL\", \"CKMB\", \"TROPONINI\"    Lab Results   Component Value Date    BUN 12.7 03/18/2024     (L) 03/18/2024    CO2 25 03/18/2024       Lab Results   Component Value Date    WBC 11.5 (H) 03/18/2024    HGB 11.7 03/18/2024    HCT 35.8 03/18/2024    MCV 86 03/18/2024     " "03/18/2024       No results found for: \"BNP\"    Clinically Significant Risk Factors Present on Admission           # Hypercalcemia: corrected calcium is >10.1, will monitor as appropriate    # Hypoalbuminemia: Lowest albumin = 3.3 g/dL at 3/18/2024  7:44 AM, will monitor as appropriate          # Overweight: Estimated body mass index is 25.51 kg/m  as calculated from the following:    Height as of this encounter: 1.549 m (5' 1\").    Weight as of this encounter: 61.2 kg (135 lb).             hyponatremia and Hypo-osmolality               "

## 2024-03-18 NOTE — PLAN OF CARE
"PRIMARY DIAGNOSIS: \"GENERIC\" NURSING  OUTPATIENT/OBSERVATION GOALS TO BE MET BEFORE DISCHARGE:  ADLs back to baseline: No    Activity and level of assistance: Up with maximum assistance. Consider SW and/or PT evaluation.     Pain status: Improved but still requiring IV narcotics.    Return to near baseline physical activity: No     Discharge Planner Nurse   Safe discharge environment identified: Yes  Barriers to discharge: Yes       Entered by: Shahrzad Reed RN 03/18/2024 3:04 AM     Please review provider order for any additional goals.   Nurse to notify provider when observation goals have been met and patient is ready for discharge.Goal Outcome Evaluation:                        "

## 2024-03-18 NOTE — PROGRESS NOTES
03/18/24 0735   Appointment Info   Signing Clinician's Name / Credentials (OT) Kely Hirsch JOSE OTR/L CLT   Quick Adds   Quick Adds Certification   Living Environment   People in Home alone;facility resident   Current Living Arrangements assisted living   Home Accessibility no concerns   Transportation Anticipated family or friend will provide   Living Environment Comments I with ADLs,A with driving, cleaning, and meals. I with medication   Self-Care   Equipment Currently Used at Home none   General Information   Onset of Illness/Injury or Date of Surgery 03/17/24   Referring Physician Joe Barajas MD   Additional Occupational Profile Info/Pertinent History of Current Problem Vale Solares is a 89 year old female with PMHx of HTN, chronic hyponatremia, CAD though no prior complications who presented as a trauma patient after a fall at [employer].   Existing Precautions/Restrictions fall   Right Upper Extremity (Weight-bearing Status) non weight-bearing (NWB)   Cognitive Status Examination   Affect/Mental Status (Cognitive) flat/blunted affect   Follows Commands follows one-step commands   Cognitive Status Comments will monitor cognition   Bed Mobility   Bed Mobility supine-sit   Supine-Sit [place] (Bed Mobility) supervision   Transfers   Transfers sit-stand transfer   Sit-Stand Transfer   Sit-Stand [place] (Transfers) contact guard   Balance   Balance Comments EOB sitting- SBA   Activities of Daily Living   BADL Assessment/Intervention lower body dressing   Lower Body Dressing Assessment/Training   [place] Level (Lower Body Dressing) minimum assist (75% patient effort);pants/bottoms   Clinical Impression   Criteria for Skilled Therapeutic Interventions Met (OT) Yes, treatment indicated   OT Diagnosis decreased ADL I'dence   OT Problem List-Impairments impacting ADL problems related to;cognition;strength;mobility   Assessment of Occupational Performance 1-3 Performance Deficits    Identified Performance Deficits trsfs, drsg, standing tolerance   Planned Therapy Interventions (OT) ADL retraining;transfer training   Clinical Decision Making Complexity (OT) detailed assessment/moderate complexity   Risk & Benefits of therapy have been explained care plan/treatment goals reviewed   OT Total Evaluation Time   OT Eval, Moderate Complexity Minutes (84236) 8   Therapy Certification   Medical Diagnosis closed displaced fx of surgical neck of right humerus   Start of Care Date 03/18/24   Certification date from 03/18/24   Certification date to 03/25/24   OT Goals   Therapy Frequency (OT) Daily   OT Predicted Duration/Target Date for Goal Attainment 03/25/24   OT Goals Hygiene/Grooming;Lower Body Dressing;Transfers;Cognition   OT: Hygiene/Grooming supervision/stand-by assist   OT: Lower Body Dressing Supervision/stand-by assist   OT: Transfer Supervision/stand-by assist   OT: Cognitive Patient/caregiver will verbalize understanding of cognitive assessment results/recommendations as needed for safe discharge planning   Interventions   Interventions Quick Adds Self-Care/Home Management   Self-Care/Home Management   Self-Care/Home Mgmt/ADL, Compensatory, Meal Prep Minutes (27178) 8   Treatment Detail/Skilled Intervention Evaluation completed. Patient completed commode trsf with CGA and cues for technique. Donned brief with cues for one handed technique. After toileting patient completed susanne care with SBA for standing balance. Min A to pull up brief. Patient ambulated a few steps with CGA to bed. Bed trsf- CGA. Rtn to supine with SBA and cues. Bed alarm on with ac light in reach   OT Discharge Planning   OT Plan trsfs, standing- g/h, LB drsg- using 1 handed techniuqe   OT Discharge Recommendation (DC Rec) Transitional Care Facility   OT Rationale for DC Rec Patient demo'd decreased independence due to R humerus fracture and NWB status. Patient would benefit from TCU to increase strength and [place]  for return to ADLs.   OT Brief overview of current status CGA   Total Session Time   Timed Code Treatment Minutes 8   Total Session Time (sum of timed and untimed services) 16      [facility]  OUTPATIENT OCCUPATIONAL THERAPY  EVALUATION  PLAN OF TREATMENT FOR OUTPATIENT REHABILITATION  (COMPLETE FOR INITIAL CLAIMS ONLY)  Patient's Last Name, First Name, M.I.  YOB: 1934  Vale Solares                          Provider's Name  [facility] Medical Record No.  [id number]                             Onset Date:  03/17/24   Start of Care Date:  03/18/24   Type:     ___PT   _X_OT   ___SLP Medical Diagnosis:  closed displaced fx of surgical neck of right humerus                    OT Diagnosis:  decreased ADL I'dence Visits from SOC:  1     See note for plan of treatment, functional goals and certification details    I CERTIFY THE NEED FOR THESE SERVICES FURNISHED UNDER        THIS PLAN OF TREATMENT AND WHILE UNDER MY CARE     (Physician co-signature of this document indicates review and certification of the therapy plan).

## 2024-03-18 NOTE — PROGRESS NOTES
ORTHOPEDIC CONSULTATION    Consultation  Vale Solares,  1934, MRN [id number]    Syncope and collapse [R55]  History of hypertension [Z86.79]  History of coronary artery disease [Z86.79]  Closed fracture of nasal bone, initial encounter [S02.2XXA]  Closed displaced fracture of surgical neck of right humerus, unspecified fracture morphology, initial encounter [S42.211A]    PCP: Rober Holloway, [phone]   Code status:  Full Code       Extended Emergency Contact Information  Primary Emergency Contact: Matthew Solares  Address: [address]  [place] [address]  Home Phone: [phone]  Relation: Son  Secondary Emergency Contact: Leslye BarnettKent  Address: [address] [place] [address]  Home Phone: [phone]  Relation: Daughter         IMPRESSION:  Right mildly displaced fracture of the greater tuberosity and impaction across the surgical neck     PLAN:  This patient was discussed with Dr. Foster, on-call surgeon for [facility] and they are in agreement with the following plan.   -No indication for urgent surgical intervention at this time.  Would recommend nonoperative management of this fracture including immobilization with use of a sling  -Sling to be worn on the right upper extremity at all times besides hygiene cares  -Nonweightbearing right upper extremity  -Pain control per primary team  -PT/OT for disposition recommendations.  They are recommending TCU at discharge CM/SW to assist with discharge  -Follow-up with one of our shoulder surgeons in 1 to 2 weeks following discharge for repeat x-rays and further management    Ortho will sign off. Please feel free to reach out with any further questions or concerns.       Thank you for including [facility] in the care of Vale Solares. It has been a pleasure participating in their care.        CHIEF COMPLAINT: Closed displaced fracture of surgical neck of right humerus, unspecified  fracture morphology, initial encounter    HISTORY OF PRESENT ILLNESS:  The patient is seen in orthopedic consultation at the request of Dangelo Bailey MD for right shoulder fracture.  The patient is a 89 year old female     The patient presents today with pain in her right shoulder following a fall yesterday, 3/17/24.  She was walking out of [employer] when she tripped and fell, she reached out with her right arm to catch herself on the way down and upon impact had immediate onset of pain in her right shoulder.  Prior to this injury she was weightbearing and ambulating normally without any assistance.  She came into the emergency department was found to have a proximal humerus fracture.  She is not on any anticoagulation.  She has no history of shoulder surgeries or pathology in the past.  Pain is in the shoulder, no radicular pain at this time.  She is comfortable in a sling.      ALLERGIES:   Review of patient's allergies indicates No Known Allergies      MEDICATIONS UPON ADMISSION:  Medications were reviewed.  They include:   Medications Prior to Admission   Medication Sig Dispense Refill Last Dose    acetaminophen (TYLENOL) 325 MG tablet Take 650 mg by mouth every 8 hours as needed for mild pain       amLODIPine (NORVASC) 5 MG tablet Take 5 mg by mouth daily   3/17/2024 at am    calcium carbonate-vitamin D (OSCAL) 500-5 MG-MCG tablet Take 1 tablet by mouth daily   3/17/2024 at am    carboxymethylcellulose PF (REFRESH PLUS) 0.5 % ophthalmic solution 2 drops as needed for dry eyes       guaiFENesin (ROBITUSSIN) 20 mg/mL liquid Take 200 mg by mouth every 6 hours as needed for cough       Multiple Vitamins-Minerals (PRESERVISION AREDS 2 PO) Take 1 tablet by mouth 2 times daily   3/17/2024 at am    vitamin D3 (CHOLECALCIFEROL) 50 mcg (2000 units) tablet Take 2 tablets by mouth daily   3/17/2024 at am         SOCIAL HISTORY:   she        FAMILY HISTORY:  family history is not on file.      REVIEW OF SYSTEMS:   Reviewed  "with patient. See HPI, otherwise negative       PHYSICAL EXAMINATION:  Vitals: /63 (BP Location: Left arm)   Pulse 60   Temp 97.6  F (36.4  C) (Oral)   Resp 16   Ht 1.549 m (5' 1\")   Wt 61.2 kg (135 lb)   SpO2 94%   BMI 25.51 kg/m    General: On examination, the patient is resting comfortably, NAD, awake, and alert and oriented to person, place, time, and, and general circumstances   SKIN: There is right shoulder swelling.  No ecchymosis, erythema, or drainage  Pulses:  Brachial and radial pulse is intact and equal bilaterally  Sensation: intact and equal bilaterally to the distal upper extremities.  Tenderness: Tender to palpation of the proximal humerus, no other tenderness  ROM: She is able to wiggle all fingers.  Able to make a thumbs up, okay sign, cross fingers, and flex/extend wrist without pain.  Deferred elbow and shoulder range of motion  Motor: hand  intact, wrist flexion/extension with resistance intact.  Contralateral side= Full range of motion, Negative joint instability findings, 5/5 motor groups about the joint, Non-tender.       RADIOGRAPHIC EVALUATION:  Personally reviewed  EXAM: XR HUMERUS RIGHT G/E 2 VIEWS, XR SHOULDER RIGHT 2 VIEWS  LOCATION: [facility]  DATE: 3/17/2024     INDICATION: Right arm pain after injury.  COMPARISON: None.                                                                      IMPRESSION: Acute proximal humeral fracture. Mildly displaced fracture of the posterior greater tuberosity. Additional deformity and heterogeneous lucency across the surgical neck of the proximal humerus, almost certainly represents additional fracture   in this location; evaluation is somewhat limited due to internal rotation of the humerus on each of the views. CT could further evaluate and provide treatment planning, if clinically appropriate. Glenohumeral and acromial clavicular joint alignment is   normal. Elbow joint alignment is normal.    PERTINENT " LABS:  Personally reviewed  Recent Labs   Lab Test 03/18/24  0744   HGB 11.7            LUCAS SMITH PA-C  Date: 3/18/2024  Time: 12:40 PM  [facility]    CC1:   Dangelo Bailey MD    CC2:   Rober Holloway

## 2024-03-18 NOTE — PROGRESS NOTES
"PRIMARY DIAGNOSIS: \"GENERIC\" NURSING  OUTPATIENT/OBSERVATION GOALS TO BE MET BEFORE DISCHARGE:  ADLs back to baseline: No    Activity and level of assistance: Assist x 2    Pain status: Improved but still requiring IV narcotics.    Return to near baseline physical activity: No     Discharge Planner Nurse   Safe discharge environment identified: No  Barriers to discharge: Yes         "

## 2024-03-18 NOTE — PROGRESS NOTES
[facility]    Medicine Progress Note - Hospitalist Service    Date of Admission:  3/17/2024    Assessment & Plan   Vale Solares is a 89 year old female with PMHx of HTN, chronic hyponatremia, CAD though no prior complications who presented as a trauma patient after a fall at [employer].     #Possible syncope  #Fall  The patient reports feeling like her usual self leading up to the episode. Sudden onset dizziness and legs gave out while walking out of [employer]. Doesn't recall feeling flushed or warm. She denies losing consciousness, but EMS report some period of unconsciousness. No seizure activity reported. Symptoms were not immediate onset after standing. No ASHOK to support significant dehydration. History fitting most likely cardiac etiology.   - Cardiac telemetry   - Echo left ventricle is normal size.  Left ventricular function normal ejection fraction estimated 55 to 60%.  Mild concentric left ventricular hypertrophy.  Normal right ventricle size and systolic function.  Mild 1+ aortic regurg.  - CT head/face/c-spine with nasal fracture, otherwise no acute trauma  - Monitor trauma complications as below; low threshold for CTA C/A/P if worsening pain or hemoglobin dropping.   -Cardiology recommendations: Continue monitoring telemetry, monitor BP, adjust such as amlodipine slowly given age of patient -believe more fall related than true syncope  -PT/OT recommendations: TCU, care management involved in disposition with TCU established for tomorrow afternoon.    #Left-sided pulmonary consolidation  #Pulmonary contusion versus pneumonia  #Shaking   With history of being completely baseline prior to her fall, consolidation is much more likely to be a contusion than a pneumonia. But with the patient's shaking, could represent rigors.  No shaking noted this AM  - CT chest showing no acute pulmonary findings.  No pneumothorax or pulmonary contusion.  No pneumonia, pleural effusion, or  hemothorax.  Mild to moderate nonspecific pulmonary fibrotic changes.    - Blood cultures pending, only aerobic culture drawn  -WBC decreasing to 11.5, check CBC in AM  - COVID, flu, RSV swab negative   - Given CT chest unremarkable for pneumonia, will discontinue antibiotic therapy.     #Acute right humeral fracture  - Orthopedic recommendations: No indication for urgent surgical intervention at this time.  Would recommend nonoperative management of fracture with immobilization with use of the sling.  Sling to be worn on right upper extremity at all times besides hygiene cares.  Nonweightbearing right upper extremity.  PT and OT.  Follow-up 1 to 2 weeks outpatient for repeat x-rays and further management.    #Acute right-sided scalp and periorbital hematoma  - Would repeat CT head for any changes in cognition, headache, or neuro symptoms    #Acute nasal fracture  No epistaxis, should follow up with ENT. Right eye swollen and significant bruising noted in AM  - ENT outpatient referral     #Primary HTN  -No bleeding, will resume home amlodipine at 2.5mg instead of 5mg given age    #History of CAD  Per chart review, no prior MI or cardiac complications.       Observation Goals: -diagnostic tests and consults completed and resulted, -vital signs normal or at patient baseline, -tolerating oral intake to maintain hydration, -adequate pain control on oral analgesics, Nurse to notify provider when observation goals have been met and patient is ready for discharge.  Diet: NPO for Medical/Clinical Reasons Except for: Ice Chips, Meds    DVT Prophylaxis: Pneumatic Compression Devices  Vernon Catheter: Not present  Lines: None     Cardiac Monitoring: ACTIVE order. Indication: Trauma  Code Status: Full Code      Clinically Significant Risk Factors Present on Admission           # Hypercalcemia: corrected calcium is >10.1, will monitor as appropriate    # Hypoalbuminemia: Lowest albumin = 3.3 g/dL at 3/18/2024  7:44 AM, will  "monitor as appropriate          # Overweight: Estimated body mass index is 25.51 kg/m  as calculated from the following:    Height as of this encounter: 1.549 m (5' 1\").    Weight as of this encounter: 61.2 kg (135 lb).              Disposition Plan      Expected Discharge Date: 03/18/2024      Destination: assisted living            The patient's care was discussed with the Attending Physician, Dr. Bailey who independently met with and assessed the patient and is agreement with the assessment and plan.    Selena Stevenson PA-C  Hospitalist Service  [facility]  Securely message with [employer] (more info)  Text page via [facility] Paging/Directory   ______________________________________________________________________    Interval History   Spoke with patientVale at bedside this morning.  She indicates that she had gotten up to leave [employer] and was starting to walk out the door and suddenly felt lightheaded and like her knees were going to give out from under her.  Patient denies full loss of consciousness or blacking out.  She reports facial pain including nose, forehead, right eye.  Right eye quite bruised and swollen today.  She denies vision changes at this time, including no floaters in vision, nor double vision or blurry vision.  Continues to report throbbing on the right side of her head, though this is not changed since injury occurred.  Right shoulder painful, stable at 5/10 when patient is not moving but does skyrocket when she does start to move.  No active fever/chills, chest pain, shortness of breath.  Denies abdominal pain or nausea or vomiting.  Patient does have an occasional cough, but no recent upper respiratory infection or sick contacts.    Physical Exam   Vital Signs: Temp: 97.6  F (36.4  C) Temp src: Oral BP: 137/63 Pulse: 60   Resp: 16 SpO2: 94 % O2 Device: None (Room air)    Weight: 135 lbs 0 oz    Physical Exam  GENERAL: Well-developed, older female in no apparent distress. "   EYES: Left lids and lashes normal.  Right lid quite swollen with significant bruising surrounding right eye.  Lid 60% closed.  No conjunctivitis, injection or icterus.   HENT: Large right anterior scalp/periorbital hematoma.  Scrapes to bilateral nose bridge and forehead.  Mucous membranes moist.  RESPIRATORY: Slightly diminished breath sounds left lower lobe, though clear. Appropriate effort without accessory muscle use.   CARDIOVASCULAR: Regular rate and rhythm, no murmurs gallops or rubs.   ABDOMEN: Bowel sounds present, soft and non-tender to palpation. Non-distended.  EXTREMITIES: No lower extremity edema.   NEURO: Alert and oriented x 3.   SKIN: No rashes or bleeding.  PSYCH: Appropriate mood and affect. Pleasant and cooperative.       Medical Decision Making       60 MINUTES SPENT BY ME on the date of service doing chart review, history, exam, documentation & further activities per the note.      Data   ------------------------- PAST 24 HR DATA REVIEWED -----------------------------------------------    I have personally reviewed the following data over the past 24 hrs:    11.5 (H)  \   11.7   / 295     133 (L) 100 12.7 /  107 (H)   4.1 25 0.84 \     ALT: 21 AST: 25 AP: 78 TBILI: 0.4   ALB: 3.3 (L) TOT PROTEIN: 6.5 LIPASE: N/A     Trop: 16 (H) BNP: N/A     TSH: 6.39 (H) T4: 1.31 A1C: N/A       Imaging results reviewed over the past 24 hrs:   Recent Results (from the past 24 hour(s))   Head CT w/o contrast    Narrative    EXAM: CT HEAD W/O CONTRAST, CT CERVICAL SPINE W/O CONTRAST, CT FACIAL BONES WITHOUT CONTRAST  LOCATION: [facility]  DATE: 3/17/2024    INDICATION: trauma  COMPARISON: None.  TECHNIQUE:   1) Routine CT Head without IV contrast. Multiplanar reformats. Dose reduction techniques were used.  2) Routine CT Facial Bones without IV contrast. Multiplanar reformats. Dose reduction techniques were used.  3) Routine CT Cervical Spine without IV contrast. Multiplanar reformats.  Dose reduction techniques were used.    FINDINGS:  HEAD CT:   INTRACRANIAL CONTENTS: No intracranial hemorrhage, extraaxial collection, or mass effect.  No CT evidence of acute infarct. Encephalitis is present in the left frontal lobe. Moderate presumed chronic small vessel ischemic changes. Mild to moderate   generalized volume loss. No hydrocephalus.     OSSEOUS STRUCTURES/SOFT TISSUES: Forehead soft tissue hematoma and laceration. The calvarium is intact.     FACIAL BONE CT:  OSSEOUS STRUCTURES/SOFT TISSUES: Soft tissue hematomas present over the forehead with small subcutaneous emphysema suggestive of laceration. Additional soft tissue hematoma along the nose and right cheek. Comminuted, mildly displaced and depressed right   nasal bone fracture. Fracture is depressed by approximately 3 mm. Mildly depressed fracture of the anterior right and left nasal bones along the nasal bridge. Mildly displaced fracture of the anterior nasal septum. Multifocal dental decay. A small   vertical lucency is associated with the posterior most left mandibular molar    ORBITAL CONTENTS: Prior bilateral cataract surgery. Visualized portions of the orbits are otherwise unremarkable.    SINUSES: No paranasal sinus mucosal disease.    CERVICAL SPINE CT:   VERTEBRA: Normal vertebral body heights and alignment. No fracture or posttraumatic subluxation.     CANAL/FORAMINA: No high-grade spinal stenosis. Osseous foraminal stenosis is severe at C4-C5 and C5-C6.    PARASPINAL: No extraspinal abnormality. Visualized lung fields are clear.      Impression    IMPRESSION:  HEAD CT:  1.  No acute intracranial process.  2.  Chronic changes as above.    FACIAL BONE CT:  1.  Comminuted, mildly displaced and depressed fracture of the right nasal bone.  2.  Mildly depressed fracture of the anterior right and left nasal bones along the nasal bridge.  3.  Mildly displaced fracture of the anterior nasal septum.    CERVICAL SPINE CT:  1.  No fracture or  posttraumatic subluxation.   CT Facial Bones without Contrast    Narrative    EXAM: CT HEAD W/O CONTRAST, CT CERVICAL SPINE W/O CONTRAST, CT FACIAL BONES WITHOUT CONTRAST  LOCATION: [facility]  DATE: 3/17/2024    INDICATION: trauma  COMPARISON: None.  TECHNIQUE:   1) Routine CT Head without IV contrast. Multiplanar reformats. Dose reduction techniques were used.  2) Routine CT Facial Bones without IV contrast. Multiplanar reformats. Dose reduction techniques were used.  3) Routine CT Cervical Spine without IV contrast. Multiplanar reformats. Dose reduction techniques were used.    FINDINGS:  HEAD CT:   INTRACRANIAL CONTENTS: No intracranial hemorrhage, extraaxial collection, or mass effect.  No CT evidence of acute infarct. Encephalitis is present in the left frontal lobe. Moderate presumed chronic small vessel ischemic changes. Mild to moderate   generalized volume loss. No hydrocephalus.     OSSEOUS STRUCTURES/SOFT TISSUES: Forehead soft tissue hematoma and laceration. The calvarium is intact.     FACIAL BONE CT:  OSSEOUS STRUCTURES/SOFT TISSUES: Soft tissue hematomas present over the forehead with small subcutaneous emphysema suggestive of laceration. Additional soft tissue hematoma along the nose and right cheek. Comminuted, mildly displaced and depressed right   nasal bone fracture. Fracture is depressed by approximately 3 mm. Mildly depressed fracture of the anterior right and left nasal bones along the nasal bridge. Mildly displaced fracture of the anterior nasal septum. Multifocal dental decay. A small   vertical lucency is associated with the posterior most left mandibular molar    ORBITAL CONTENTS: Prior bilateral cataract surgery. Visualized portions of the orbits are otherwise unremarkable.    SINUSES: No paranasal sinus mucosal disease.    CERVICAL SPINE CT:   VERTEBRA: Normal vertebral body heights and alignment. No fracture or posttraumatic subluxation.     CANAL/FORAMINA: No  high-grade spinal stenosis. Osseous foraminal stenosis is severe at C4-C5 and C5-C6.    PARASPINAL: No extraspinal abnormality. Visualized lung fields are clear.      Impression    IMPRESSION:  HEAD CT:  1.  No acute intracranial process.  2.  Chronic changes as above.    FACIAL BONE CT:  1.  Comminuted, mildly displaced and depressed fracture of the right nasal bone.  2.  Mildly depressed fracture of the anterior right and left nasal bones along the nasal bridge.  3.  Mildly displaced fracture of the anterior nasal septum.    CERVICAL SPINE CT:  1.  No fracture or posttraumatic subluxation.   CT Cervical Spine w/o Contrast    Narrative    EXAM: CT HEAD W/O CONTRAST, CT CERVICAL SPINE W/O CONTRAST, CT FACIAL BONES WITHOUT CONTRAST  LOCATION: [facility]  DATE: 3/17/2024    INDICATION: trauma  COMPARISON: None.  TECHNIQUE:   1) Routine CT Head without IV contrast. Multiplanar reformats. Dose reduction techniques were used.  2) Routine CT Facial Bones without IV contrast. Multiplanar reformats. Dose reduction techniques were used.  3) Routine CT Cervical Spine without IV contrast. Multiplanar reformats. Dose reduction techniques were used.    FINDINGS:  HEAD CT:   INTRACRANIAL CONTENTS: No intracranial hemorrhage, extraaxial collection, or mass effect.  No CT evidence of acute infarct. Encephalitis is present in the left frontal lobe. Moderate presumed chronic small vessel ischemic changes. Mild to moderate   generalized volume loss. No hydrocephalus.     OSSEOUS STRUCTURES/SOFT TISSUES: Forehead soft tissue hematoma and laceration. The calvarium is intact.     FACIAL BONE CT:  OSSEOUS STRUCTURES/SOFT TISSUES: Soft tissue hematomas present over the forehead with small subcutaneous emphysema suggestive of laceration. Additional soft tissue hematoma along the nose and right cheek. Comminuted, mildly displaced and depressed right   nasal bone fracture. Fracture is depressed by approximately 3 mm.  Mildly depressed fracture of the anterior right and left nasal bones along the nasal bridge. Mildly displaced fracture of the anterior nasal septum. Multifocal dental decay. A small   vertical lucency is associated with the posterior most left mandibular molar    ORBITAL CONTENTS: Prior bilateral cataract surgery. Visualized portions of the orbits are otherwise unremarkable.    SINUSES: No paranasal sinus mucosal disease.    CERVICAL SPINE CT:   VERTEBRA: Normal vertebral body heights and alignment. No fracture or posttraumatic subluxation.     CANAL/FORAMINA: No high-grade spinal stenosis. Osseous foraminal stenosis is severe at C4-C5 and C5-C6.    PARASPINAL: No extraspinal abnormality. Visualized lung fields are clear.      Impression    IMPRESSION:  HEAD CT:  1.  No acute intracranial process.  2.  Chronic changes as above.    FACIAL BONE CT:  1.  Comminuted, mildly displaced and depressed fracture of the right nasal bone.  2.  Mildly depressed fracture of the anterior right and left nasal bones along the nasal bridge.  3.  Mildly displaced fracture of the anterior nasal septum.    CERVICAL SPINE CT:  1.  No fracture or posttraumatic subluxation.   Humerus XR, G/E 2 views, right    Narrative    EXAM: XR HUMERUS RIGHT G/E 2 VIEWS, XR SHOULDER RIGHT 2 VIEWS  LOCATION: [facility]  DATE: 3/17/2024    INDICATION: Right arm pain after injury.  COMPARISON: None.      Impression    IMPRESSION: Acute proximal humeral fracture. Mildly displaced fracture of the posterior greater tuberosity. Additional deformity and heterogeneous lucency across the surgical neck of the proximal humerus, almost certainly represents additional fracture   in this location; evaluation is somewhat limited due to internal rotation of the humerus on each of the views. CT could further evaluate and provide treatment planning, if clinically appropriate. Glenohumeral and acromial clavicular joint alignment is   normal. Elbow  joint alignment is normal.   XR Chest Port 1 View    Narrative    EXAM: XR CHEST PORT 1 VIEW  LOCATION: [facility]  DATE: 3/17/2024    INDICATION: syncope and fall  COMPARISON: None.      Impression    IMPRESSION: Extensive consolidation in the left mid and lower lung, which may represent pneumonia versus pulmonary contusions/atelectasis. Possible trace left effusion. No pneumothorax. Heart size and mediastinal contours are within normal limits where   seen. Aortic arch calcifications. Unchanged comminuted proximal right humerus fracture.   XR Shoulder Right 2 Views    Narrative    EXAM: XR HUMERUS RIGHT G/E 2 VIEWS, XR SHOULDER RIGHT 2 VIEWS  LOCATION: [facility]  DATE: 3/17/2024    INDICATION: Right arm pain after injury.  COMPARISON: None.      Impression    IMPRESSION: Acute proximal humeral fracture. Mildly displaced fracture of the posterior greater tuberosity. Additional deformity and heterogeneous lucency across the surgical neck of the proximal humerus, almost certainly represents additional fracture   in this location; evaluation is somewhat limited due to internal rotation of the humerus on each of the views. CT could further evaluate and provide treatment planning, if clinically appropriate. Glenohumeral and acromial clavicular joint alignment is   normal. Elbow joint alignment is normal.   Echocardiogram Complete   Result Value    LVEF  55-60%    Narrative    [id number]  [id number]  [id number]  [id number]^[place]ANI^[place] [address]     Name: WAI GOODSON  MRN: [id number]  : 1934  Study Date: 2024 08:04 AM  Age: 89 yrs  Gender: Female  Patient Location: [facility]  Reason For Study: Syncope  Ordering Physician: LUCÍA MOHR  Performed By: MAHENDRA     BSA: 1.6 m2  Height: 61 in  Weight: 135 lb  HR:  57  ______________________________________________________________________________  ______________________________________________________________________________  Interpretation Summary     The left ventricle is normal in size.  Left ventricular function is normal.The ejection fraction is 55-60%.  There is mild concentric left ventricular hypertrophy.  Normal right ventricle size and systolic function.  There is mild (1+) aortic regurgitation.  ______________________________________________________________________________  Left Ventricle  The left ventricle is normal in size. Left ventricular function is normal.The  ejection fraction is 55-60%. There is mild concentric left ventricular  hypertrophy. Left ventricular diastolic function is normal. No regional wall  motion abnormalities noted.     Right Ventricle  Normal right ventricle size and systolic function.     Atria  Normal left atrial size. Right atrial size is normal. There is no color  Doppler evidence of an atrial shunt.     Mitral Valve  Mitral valve leaflets appear normal. There is mild (1+) mitral regurgitation.     Tricuspid Valve  The tricuspid valve is not well visualized, but is grossly normal. This degree  of valvular regurgitation is within normal limits. Right ventricular systolic  pressure could not be approximated due to inadequate tricuspid regurgitation.     Aortic Valve  Aortic valve leaflets appear normal. There is mild (1+) aortic regurgitation.     Pulmonic Valve  The pulmonic valve is not well seen, but is grossly normal. This degree of  valvular regurgitation is within normal limits.     Vessels  The aorta root is normal. Normal size ascending aorta. IVC diameter <2.1 cm  collapsing >50% with sniff suggests a normal RA pressure of 3 mmHg.     Pericardium  There is no pericardial effusion.     ______________________________________________________________________________  MMode/2D Measurements & Calculations  IVSd: 1.3 cm  LVIDd: 4.4  cm  LVIDs: 2.7 cm  LVPWd: 1.1 cm     FS: 39.4 %  LV mass(C)d: 195.0 grams  LV mass(C)dI: 122.0 grams/m2  Ao root diam: 2.8 cm  LA dimension: 3.5 cm  asc Aorta Diam: 3.1 cm  LA/Ao: 1.2  LVOT diam: 1.9 cm  LVOT area: 2.8 cm2  Ao root diam index Ht(cm/m): 1.8  Ao root diam index BSA (cm/m2): 1.8  Asc Ao diam index BSA (cm/m2): 1.9  Asc Ao diam index Ht(cm/m): 2.0  LA Volume (BP): 31.5 ml     LA Volume Index (BP): 19.7 ml/m2  LA Volume Indexed (AL/bp): 21.4 ml/m2  RV Base: 3.8 cm  RWT: 0.52  TAPSE: 2.2 cm     Doppler Measurements & Calculations  MV E max casper: 79.1 cm/sec  MV A max casper: 110.0 cm/sec  MV E/A: 0.72  MV max P.7 mmHg  MV mean P.0 mmHg  MV V2 VTI: 35.3 cm  MVA(VTI): 1.8 cm2     MV dec slope: 234.0 cm/sec2  MV dec time: 0.34 sec  Ao V2 max: 180.0 cm/sec  Ao max P.0 mmHg  Ao V2 mean: 123.0 cm/sec  Ao mean P.0 mmHg  Ao V2 VTI: 44.2 cm  KATHLEEN(I,D): 1.4 cm2  KATHLEEN(V,D): 1.6 cm2  AI P1/2t: 603.1 msec  LV V1 max P.2 mmHg  LV V1 max: 102.0 cm/sec  LV V1 VTI: 22.5 cm  SV(LVOT): 63.8 ml  SI(LVOT): 39.9 ml/m2  PA acc time: 0.09 sec  PI end-d casper: 74.1 cm/sec  AV Casper Ratio (DI): 0.57  KATHLEEN Index (cm2/m2): 0.90  E/E' av.0  Lateral E/e': 11.5  Medial E/e': 12.5  RV S Casper: 13.2 cm/sec     ______________________________________________________________________________  Report approved by: Jay Pace 2024 10:38 AM

## 2024-03-18 NOTE — PLAN OF CARE
Problem: Adult Inpatient Plan of Care  Goal: Absence of Hospital-Acquired Illness or Injury  Outcome: Progressing  Intervention: Identify and Manage Fall Risk  Recent Flowsheet Documentation  Taken 3/18/2024 0426 by Shahrzad Reed RN  Safety Promotion/Fall Prevention:   activity supervised   clutter free environment maintained   lighting adjusted   room organization consistent   safety round/check completed  Taken 3/18/2024 0007 by Shahrzad Reed RN  Safety Promotion/Fall Prevention:   activity supervised   clutter free environment maintained   lighting adjusted   room organization consistent   safety round/check completed  Intervention: Prevent Skin Injury  Recent Flowsheet Documentation  Taken 3/18/2024 0426 by Shahrzad Reed RN  Body Position: position changed independently  Skin Protection: adhesive use limited  Device Skin Pressure Protection: tubing/devices free from skin contact  Taken 3/18/2024 0400 by Shahrzad Reed RN  Body Position: turned  Taken 3/18/2024 0200 by Shahrzad Reed RN  Body Position: turned  Taken 3/18/2024 0007 by Shahrzad Reed RN  Body Position: position changed independently  Skin Protection: adhesive use limited  Device Skin Pressure Protection: tubing/devices free from skin contact  Taken 3/18/2024 0000 by Shahrzad Reed RN  Body Position: turned  Intervention: Prevent Infection  Recent Flowsheet Documentation  Taken 3/18/2024 0426 by Shahrzad Reed RN  Infection Prevention:   personal protective equipment utilized   single patient room provided   rest/sleep promoted  Taken 3/18/2024 0007 by Shahrzad Reed RN  Infection Prevention:   personal protective equipment utilized   single patient room provided   rest/sleep promoted  Goal: Optimal Comfort and Wellbeing  Outcome: Progressing  Intervention: Monitor Pain and Promote Comfort  Recent Flowsheet Documentation  Taken 3/18/2024 0400 by Shahrzad Reed RN  Pain  Management Interventions:   medication (see MAR)   cold applied  Taken 3/18/2024 0031 by Shahrzad Reed RN  Pain Management Interventions:   medication (see MAR)   cold applied     Problem: Adult Inpatient Plan of Care  Goal: Absence of Hospital-Acquired Illness or Injury  Intervention: Prevent Skin Injury  Recent Flowsheet Documentation  Taken 3/18/2024 0426 by Shahrzad Reed RN  Body Position: position changed independently  Skin Protection: adhesive use limited  Device Skin Pressure Protection: tubing/devices free from skin contact  Taken 3/18/2024 0400 by Shahrzad Reed RN  Body Position: turned  Taken 3/18/2024 0200 by Shahrzad Reed RN  Body Position: turned  Taken 3/18/2024 0007 by Shahrzad Reed RN  Body Position: position changed independently  Skin Protection: adhesive use limited  Device Skin Pressure Protection: tubing/devices free from skin contact  Taken 3/18/2024 0000 by Shahrzad Reed RN  Body Position: turned   Goal Outcome Evaluation:       Pt is A & O x 4,.VSS except hypertensive on RA. Flushed IV to give IV Dilaudid for pain but pt stated it was painful and she want the IV out. Call SWAT nurse to put in another IV. It was successful until we turn pt on her side per pt's request. IV was no longer usable and pt's other vein sites was not workable. Currently No IV. Pain addressed with PRN oxycodone, tylenol and cold packs. Tele: sinus rhythm w/ BBB. L PIV SL. NPO except for meds/ice chips. Assist x 2, and up to commode x 1. Purewick in place for overnight little output. Use commode for urine output.Nursing continue to monitor.

## 2024-03-18 NOTE — PROGRESS NOTES
03/18/24 1040   Appointment Info   Signing Clinician's Name / Credentials (PT) Indira Torres DPT   Quick Adds   Quick Adds Certification   Living Environment   People in Home alone;facility resident   Current Living Arrangements assisted living   Home Accessibility no concerns   Transportation Anticipated family or friend will provide   Self-Care   Usual Activity Tolerance good   Current Activity Tolerance fair   Equipment Currently Used at Home none   Activity/Exercise/Self-Care Comment Indpendent with ADLs.   General Information   Onset of Illness/Injury or Date of Surgery 03/17/24   Referring Physician Joe Barajas MD   Patient/Family Therapy Goals Statement (PT) none   Pertinent History of Current Problem (include personal factors and/or comorbidities that impact the POC) 89 year old female with PMHx of HTN, chronic hyponatremia, CAD though no prior complications who presented as a trauma patient after a fall at [employer].  Acute right humeral fracture   Existing Precautions/Restrictions fall;weight bearing;other (see comments)  (Sling)   Weight-Bearing Status - RUE (S)  nonweight-bearing   Strength (Manual Muscle Testing)   Strength (Manual Muscle Testing) Deficits observed during functional mobility   Bed Mobility   Bed Mobility supine-sit;sit-supine   Supine-Sit [place] (Bed Mobility) supervision;verbal cues   Sit-Supine [place] (Bed Mobility) moderate assist (50% patient effort)   Bed Mobility Limitations decreased ability to use arms for pushing/pulling   Impairments Contributing to Impaired Bed Mobility pain;decreased strength   Assistive Device (Bed Mobility) bed rails   Transfers   Transfers sit-stand transfer   Sit-Stand Transfer   Sit-Stand [place] (Transfers) supervision;verbal cues   Assistive Device (Sit-Stand Transfers)   (none)   Gait/Stairs (Locomotion)   [place] Level (Gait) minimum assist (75% patient effort)   Assistive Device (Gait)   (Hand hold assist)   Distance  in Feet (Gait) 15'   Pattern (Gait) step-through   Deviations/Abnormal Patterns (Gait) gait speed decreased   Clinical Impression   Criteria for Skilled Therapeutic Intervention Yes, treatment indicated   PT Diagnosis (PT) Impaired functional mobility   Influenced by the following impairments Weakness, pain, wb restriction   Functional limitations due to impairments Impaired strength, transfers, gait   Clinical Presentation (PT Evaluation Complexity) evolving   Clinical Presentation Rationale Presents as diagnosed   Clinical Decision Making (Complexity) moderate complexity   Planned Therapy Interventions (PT) balance training;bed mobility training;gait training;home exercise program;strengthening;transfer training   Risk & Benefits of therapy have been explained patient   PT Total Evaluation Time   PT Eval, Moderate Complexity Minutes (62340) 10   Therapy Certification   Start of care date 03/18/24   Certification date from 03/18/24   Certification date to 03/25/24   Medical Diagnosis Closed displaced fracture of surgical neck of right humerus   Physical Therapy Goals   PT Frequency Daily   PT Predicted Duration/Target Date for Goal Attainment 03/25/24   PT Goals Bed Mobility;Transfers;Gait   PT: Bed Mobility Modified independent;Supine to/from sit;Within precautions   PT: Transfers Modified independent;Sit to/from stand;Bed to/from chair;Within precautions;Assistive device   PT: Gait Supervision/stand-by assist;Assistive device;100 feet   PT Discharge Planning   PT Plan progress transfers, amb with SEC   PT Discharge Recommendation (DC Rec) Transitional Care Facility   PT Rationale for DC Rec Ax1 with all mobility at this time.   PT Brief overview of current status Amb 115' with min A, hand hold assist. Needing assist with toileting.   PT Equipment Needed at Discharge cane, straight;cane, quad   Total Session Time   Total Session Time (sum of timed and untimed services) 10       [facility]  Services  OUTPATIENT PHYSICAL THERAPY EVALUATION  PLAN OF TREATMENT FOR OUTPATIENT REHABILITATION  (COMPLETE FOR INITIAL CLAIMS ONLY)  Patient's Last Name, First Name, M.I.  YOB: 1934  Vale Solares                        Provider's Name  [facility] Medical Record No.  [id number]                             Onset Date:  03/17/24   Start of Care Date:  (P) 03/18/24   Type:     _X_PT   ___OT   ___SLP Medical Diagnosis:  (P) Closed displaced fracture of surgical neck of right humerus              PT Diagnosis:  Impaired functional mobility Visits from SOC:  1     See note for plan of treatment, functional goals and certification details    I CERTIFY THE NEED FOR THESE SERVICES FURNISHED UNDER        THIS PLAN OF TREATMENT AND WHILE UNDER MY CARE     (Physician co-signature of this document indicates review and certification of the therapy plan).                Indira Torres, PT, DPT  3/18/2024

## 2024-03-19 VITALS
WEIGHT: 135 LBS | RESPIRATION RATE: 21 BRPM | DIASTOLIC BLOOD PRESSURE: 77 MMHG | TEMPERATURE: 98 F | BODY MASS INDEX: 25.49 KG/M2 | OXYGEN SATURATION: 94 % | SYSTOLIC BLOOD PRESSURE: 175 MMHG | HEART RATE: 70 BPM | HEIGHT: 61 IN

## 2024-03-19 LAB
ANION GAP SERPL CALCULATED.3IONS-SCNC: 8 MMOL/L (ref 7–15)
BUN SERPL-MCNC: 11.1 MG/DL (ref 8–23)
CALCIUM SERPL-MCNC: 8.9 MG/DL (ref 8.8–10.2)
CHLORIDE SERPL-SCNC: 101 MMOL/L (ref 98–107)
CREAT SERPL-MCNC: 0.81 MG/DL (ref 0.51–0.95)
DEPRECATED HCO3 PLAS-SCNC: 25 MMOL/L (ref 22–29)
EGFRCR SERPLBLD CKD-EPI 2021: 69 ML/MIN/1.73M2
ERYTHROCYTE [DISTWIDTH] IN BLOOD BY AUTOMATED COUNT: 15.2 % (ref 10–15)
GLUCOSE SERPL-MCNC: 113 MG/DL (ref 70–99)
HCT VFR BLD AUTO: 37.7 % (ref 35–47)
HGB BLD-MCNC: 12.1 G/DL (ref 11.7–15.7)
MCH RBC QN AUTO: 27.8 PG (ref 26.5–33)
MCHC RBC AUTO-ENTMCNC: 32.1 G/DL (ref 31.5–36.5)
MCV RBC AUTO: 87 FL (ref 78–100)
PLATELET # BLD AUTO: 297 10E3/UL (ref 150–450)
POTASSIUM SERPL-SCNC: 3.9 MMOL/L (ref 3.4–5.3)
RBC # BLD AUTO: 4.35 10E6/UL (ref 3.8–5.2)
SODIUM SERPL-SCNC: 134 MMOL/L (ref 135–145)
WBC # BLD AUTO: 10.7 10E3/UL (ref 4–11)

## 2024-03-19 PROCEDURE — 250N000013 HC RX MED GY IP 250 OP 250 PS 637: Performed by: INTERNAL MEDICINE

## 2024-03-19 PROCEDURE — 85018 HEMOGLOBIN: CPT

## 2024-03-19 PROCEDURE — 99239 HOSP IP/OBS DSCHRG MGMT >30: CPT | Mod: FS | Performed by: HOSPITALIST

## 2024-03-19 PROCEDURE — 80048 BASIC METABOLIC PNL TOTAL CA: CPT

## 2024-03-19 PROCEDURE — 250N000013 HC RX MED GY IP 250 OP 250 PS 637

## 2024-03-19 PROCEDURE — 99207 PR APP CREDIT; MD BILLING SHARED VISIT: CPT

## 2024-03-19 PROCEDURE — 250N000013 HC RX MED GY IP 250 OP 250 PS 637: Performed by: STUDENT IN AN ORGANIZED HEALTH CARE EDUCATION/TRAINING PROGRAM

## 2024-03-19 PROCEDURE — 36415 COLL VENOUS BLD VENIPUNCTURE: CPT

## 2024-03-19 PROCEDURE — G0378 HOSPITAL OBSERVATION PER HR: HCPCS

## 2024-03-19 RX ORDER — OXYCODONE HYDROCHLORIDE 5 MG/1
5 TABLET ORAL EVERY 6 HOURS PRN
Qty: 15 TABLET | Refills: 0 | Status: SHIPPED | OUTPATIENT
Start: 2024-03-19 | End: 2024-03-20

## 2024-03-19 RX ORDER — AMLODIPINE BESYLATE 5 MG/1
2.5 TABLET ORAL DAILY
Qty: 30 TABLET | Refills: 0 | Status: SHIPPED | OUTPATIENT
Start: 2024-03-19 | End: 2024-03-22

## 2024-03-19 RX ADMIN — ACETAMINOPHEN 975 MG: 325 TABLET ORAL at 15:19

## 2024-03-19 RX ADMIN — AMLODIPINE BESYLATE 2.5 MG: 2.5 TABLET ORAL at 08:02

## 2024-03-19 RX ADMIN — OXYCODONE HYDROCHLORIDE 2.5 MG: 5 TABLET ORAL at 13:59

## 2024-03-19 RX ADMIN — ACETAMINOPHEN 975 MG: 325 TABLET ORAL at 08:02

## 2024-03-19 ASSESSMENT — ACTIVITIES OF DAILY LIVING (ADL)
ADLS_ACUITY_SCORE: 42

## 2024-03-19 NOTE — PROGRESS NOTES
Acute Traumatic Pain     1.  Pain controlled with oral analgesia: Yes      2.  Vital signs stable: No      3.  Diagnotic testing complete: No      4.  Cleared from consultants (if applicable): No      5. Return to near baseline physical activity: No

## 2024-03-19 NOTE — PLAN OF CARE
PRIMARY DIAGNOSIS: ACUTE PAIN  OUTPATIENT/OBSERVATION GOALS TO BE MET BEFORE DISCHARGE:  1. Pain Status: Improved-controlled with oral pain medications.    2. Return to near baseline physical activity: No    3. Cleared for discharge by consultants (if involved): No    Discharge Planner Nurse   Barriers to discharge: Yes       Entered by: Cathy Roberts RN 03/19/2024 2:37 AM     Please review provider order for any additional goals.   Nurse to notify provider when observation goals have been met and patient is ready for discharge.Goal Outcome Evaluation:

## 2024-03-19 NOTE — DISCHARGE SUMMARY
"[facility]  Hospitalist Discharge Summary      Date of Admission:  3/17/2024  Date of Discharge:  3/19/2024  Discharging Provider: Selena Stevenson PA-C  Discharge Service: Hospitalist Service    Discharge Diagnoses   You were diagnosed with right humeral neck fracture and nasal bridge/bone fracture secondary to a fall.   Additional findings of likely pulmonary fibrosis found - would recommend outpatient pulmonary referral per PCP.     Clinically Significant Risk Factors     # Overweight: Estimated body mass index is 25.51 kg/m  as calculated from the following:    Height as of this encounter: 1.549 m (5' 1\").    Weight as of this encounter: 61.2 kg (135 lb).       Follow-ups Needed After Discharge   Follow-up Appointments     Follow Up Care      Please follow-up with Dr. Foster' team in 2 weeks at [facility].   Call our scheduling line at [phone] to make an appointment, if you do   not already have one scheduled.        Follow Up and recommended labs and tests      Follow up with primary care provider or TCU doctor in 5-7 days.    Follow up with orthopedic doctor in 2 weeks            Unresulted Labs Ordered in the Past 30 Days of this Admission       Date and Time Order Name Status Description    3/17/2024  6:12 PM Blood Culture Arm, Left Preliminary     3/17/2024  6:12 PM Blood Culture Hand, Left Preliminary         These results will be followed up by hospitalist if positive.     Discharge Disposition   Discharged to short-term care facility  Condition at discharge: Stable    Hospital Course   Vale Solares is an 89 yo F with h/o HTN, hyponatremia, CAD, and HTN who presented after fall at [facility] which was severe enough to cause nasal fracture and right humeral fracture. Patient unsure if she had loss of consciousness. Echo and telemetry okay.  Cardiology believe fall to be more trauma than syncopal event.   Concern for left infiltrate on CXR on admit but CT chest is " showing mild to moderate bilateral fibrotic changes of unclear etiology.  No signs of acute infection.  Patient is not hypoxic and no fevers, so antibiotics stopped.  May benefit from follow up outpatient with pulmonary. Suffered nasal fracture and right humeral fracture with fall.  Ortho recommends conservative therapy as below with f/u in 1-2 weeks and will need outpatient follow up with ENT.     - Cardiology recommendations: decrease amlodipine to 2.5mg given age and BP    - Orthopedic recommendations: No indication for urgent surgical intervention at this time.  Would recommend nonoperative management of fracture with immobilization with use of the sling.  Sling to be worn on right upper extremity at all times besides hygiene cares.  Nonweightbearing right upper extremity.  PT and OT.  Follow-up 1 to 2 weeks outpatient for repeat x-rays and further management.    - PT/OT recommendations: TCU for continued PT/OT/nursing care until able to return to assisted living facility     - Would recommend ENT outpatient visit in 1-2 weeks due to nasal fracture, and pulmonary outpatient appointment per PCP due to potential pulmonary fibrosis (non-infectious lung opacity most consistent with fibrosis per CT)        Consultations This Hospital Stay   CARE MANAGEMENT / SOCIAL WORK IP CONSULT  OCCUPATIONAL THERAPY ADULT IP CONSULT  PHYSICAL THERAPY ADULT IP CONSULT  ORTHOPEDIC SURGERY IP CONSULT  CARDIOLOGY IP CONSULT  PHYSICAL THERAPY ADULT IP CONSULT  OCCUPATIONAL THERAPY ADULT IP CONSULT    Code Status   Full Code    Time Spent on this Encounter   I, Selena Stevenson PA-C, personally saw the patient today and spent less than or equal to 30 minutes discharging this patient.       Selena Stevenson PA-C  [facility]  [address] [place] [id number]  Phone: [phone]  Fax:  [phone]  ______________________________________________________________________    Physical Exam   Vital Signs: Temp: 98  F (36.7  C) Temp src: Oral BP: (!) 175/77 Pulse: 70   Resp: 21 SpO2: 94 % O2 Device: None (Room air) Oxygen Delivery: 2 LPM  Weight: 135 lbs 0 oz    Physical Exam  GENERAL: Well-developed, older female in no apparent distress.   EYES: Left lids and lashes normal.  Right lid quite swollen with significant bruising surrounding right eye (although better today).   HENT: Large right anterior scalp/periorbital hematoma.  Scrapes to bilateral nose bridge and forehead.   RESPIRATORY: Slightly diminished breath sounds left lower lobe, though clear. No  accessory muscle use.   CARDIOVASCULAR: RRR, no murmurs gallops or rubs.   ABDOMEN: Bowel sounds present, soft and non-tender to palpation. Non-distended.  SKIN: No rashes or bleeding.  PSYCH: Appropriate mood and affect.         Primary Care Physician   Rober Holloway    Discharge Orders      Follow Up Care    Please follow-up with Dr. Foster' team in 2 weeks at [facility]. Call our scheduling line at [phone] to make an appointment, if you do not already have one scheduled.     NO active or passive range of motion    NO active or passive range of motion of the shoulder     Elbow and Wrist range of motion    Active and passive elbow range of motion is permitted. Perform Fist pumps every hour while you are awake.     NO weight bearing    No weight bearing on your operative extremity.     Sling WITHOUT pillow instructions    Wear sling at all times.  May remove sling only for exercises, hygiene, and skin checks twice daily.  Contact your surgeon team if you have any redness or irritation from your sling.     General info for SNF    Length of Stay Estimate: Short Term Care: Estimated # of Days <30  Condition at Discharge: Stable  Level of care:skilled   Rehabilitation Potential: Good  Admission H&P remains valid and up-to-date:  Yes  Recent Chemotherapy: N/A  Use Nursing Home Standing Orders: Yes     Mantoux instructions    Give two-step Mantoux (PPD) Per Facility Policy Yes     Follow Up and recommended labs and tests    Follow up with primary care provider or TCU doctor in 5-7 days.    Follow up with orthopedic doctor in 2 weeks     Reason for your hospital stay    Closes displaced fracture of right humerous  Nasal Fracture     Glucose monitor nursing POCT    Before meals and at bedtime     Activity - Up with nursing assistance     Additional Discharge Instructions    Sling right arm at all aria except hygiene cares, non weight bearing right arm  Follow up ortho 1-2 weeks for repeat xrays     Full Code     Physical Therapy Adult Consult    Evaluate and treat as clinically indicated.    Reason:  fall and weakness     Occupational Therapy Adult Consult    Evaluate and treat as clinically indicated.    Reason:  fall and weakness     Fall precautions     Diet    Follow this diet upon discharge: Orders Placed This Encounter      Combination Diet Low Saturated Fat Na <2400mg Diet       Significant Results and Procedures   Most Recent 3 CBC's:  Recent Labs   Lab Test 03/19/24  0714 03/18/24  0744 03/17/24  2222   WBC 10.7 11.5* 15.9*   HGB 12.1 11.7 12.8   MCV 87 86 85    295 333     Most Recent 3 BMP's:  Recent Labs   Lab Test 03/19/24  0714 03/18/24  0744 03/17/24  1237   * 133* 133*   POTASSIUM 3.9 4.1 4.1   CHLORIDE 101 100 99   CO2 25 25 20*   BUN 11.1 12.7 12.5   CR 0.81 0.84 0.91   ANIONGAP 8 8 14   SHERIN 8.9 9.1 9.8   * 107* 148*     Most Recent 3 Hemoglobins:  Recent Labs   Lab Test 03/19/24  0714 03/18/24  0744 03/17/24  2222   HGB 12.1 11.7 12.8     Most Recent Urinalysis:  Recent Labs   Lab Test 09/22/18  1815   COLOR Straw   APPEARANCE Clear   URINEGLC Negative   URINEBILI Negative   URINEKETONE Negative   SG 1.008   UBLD Negative   URINEPH 6.5   PROTEIN Negative   UROBILINOGEN <2.0 E.U./dL   NITRITE Negative    LEUKEST Negative   ,   Results for orders placed or performed during the hospital encounter of 03/17/24   Head CT w/o contrast    Narrative    EXAM: CT HEAD W/O CONTRAST, CT CERVICAL SPINE W/O CONTRAST, CT FACIAL BONES WITHOUT CONTRAST  LOCATION: [facility]  DATE: 3/17/2024    INDICATION: trauma  COMPARISON: None.  TECHNIQUE:   1) Routine CT Head without IV contrast. Multiplanar reformats. Dose reduction techniques were used.  2) Routine CT Facial Bones without IV contrast. Multiplanar reformats. Dose reduction techniques were used.  3) Routine CT Cervical Spine without IV contrast. Multiplanar reformats. Dose reduction techniques were used.    FINDINGS:  HEAD CT:   INTRACRANIAL CONTENTS: No intracranial hemorrhage, extraaxial collection, or mass effect.  No CT evidence of acute infarct. Encephalitis is present in the left frontal lobe. Moderate presumed chronic small vessel ischemic changes. Mild to moderate   generalized volume loss. No hydrocephalus.     OSSEOUS STRUCTURES/SOFT TISSUES: Forehead soft tissue hematoma and laceration. The calvarium is intact.     FACIAL BONE CT:  OSSEOUS STRUCTURES/SOFT TISSUES: Soft tissue hematomas present over the forehead with small subcutaneous emphysema suggestive of laceration. Additional soft tissue hematoma along the nose and right cheek. Comminuted, mildly displaced and depressed right   nasal bone fracture. Fracture is depressed by approximately 3 mm. Mildly depressed fracture of the anterior right and left nasal bones along the nasal bridge. Mildly displaced fracture of the anterior nasal septum. Multifocal dental decay. A small   vertical lucency is associated with the posterior most left mandibular molar    ORBITAL CONTENTS: Prior bilateral cataract surgery. Visualized portions of the orbits are otherwise unremarkable.    SINUSES: No paranasal sinus mucosal disease.    CERVICAL SPINE CT:   VERTEBRA: Normal vertebral body heights and  alignment. No fracture or posttraumatic subluxation.     CANAL/FORAMINA: No high-grade spinal stenosis. Osseous foraminal stenosis is severe at C4-C5 and C5-C6.    PARASPINAL: No extraspinal abnormality. Visualized lung fields are clear.      Impression    IMPRESSION:  HEAD CT:  1.  No acute intracranial process.  2.  Chronic changes as above.    FACIAL BONE CT:  1.  Comminuted, mildly displaced and depressed fracture of the right nasal bone.  2.  Mildly depressed fracture of the anterior right and left nasal bones along the nasal bridge.  3.  Mildly displaced fracture of the anterior nasal septum.    CERVICAL SPINE CT:  1.  No fracture or posttraumatic subluxation.   CT Facial Bones without Contrast    Narrative    EXAM: CT HEAD W/O CONTRAST, CT CERVICAL SPINE W/O CONTRAST, CT FACIAL BONES WITHOUT CONTRAST  LOCATION: [facility]  DATE: 3/17/2024    INDICATION: trauma  COMPARISON: None.  TECHNIQUE:   1) Routine CT Head without IV contrast. Multiplanar reformats. Dose reduction techniques were used.  2) Routine CT Facial Bones without IV contrast. Multiplanar reformats. Dose reduction techniques were used.  3) Routine CT Cervical Spine without IV contrast. Multiplanar reformats. Dose reduction techniques were used.    FINDINGS:  HEAD CT:   INTRACRANIAL CONTENTS: No intracranial hemorrhage, extraaxial collection, or mass effect.  No CT evidence of acute infarct. Encephalitis is present in the left frontal lobe. Moderate presumed chronic small vessel ischemic changes. Mild to moderate   generalized volume loss. No hydrocephalus.     OSSEOUS STRUCTURES/SOFT TISSUES: Forehead soft tissue hematoma and laceration. The calvarium is intact.     FACIAL BONE CT:  OSSEOUS STRUCTURES/SOFT TISSUES: Soft tissue hematomas present over the forehead with small subcutaneous emphysema suggestive of laceration. Additional soft tissue hematoma along the nose and right cheek. Comminuted, mildly displaced and  depressed right   nasal bone fracture. Fracture is depressed by approximately 3 mm. Mildly depressed fracture of the anterior right and left nasal bones along the nasal bridge. Mildly displaced fracture of the anterior nasal septum. Multifocal dental decay. A small   vertical lucency is associated with the posterior most left mandibular molar    ORBITAL CONTENTS: Prior bilateral cataract surgery. Visualized portions of the orbits are otherwise unremarkable.    SINUSES: No paranasal sinus mucosal disease.    CERVICAL SPINE CT:   VERTEBRA: Normal vertebral body heights and alignment. No fracture or posttraumatic subluxation.     CANAL/FORAMINA: No high-grade spinal stenosis. Osseous foraminal stenosis is severe at C4-C5 and C5-C6.    PARASPINAL: No extraspinal abnormality. Visualized lung fields are clear.      Impression    IMPRESSION:  HEAD CT:  1.  No acute intracranial process.  2.  Chronic changes as above.    FACIAL BONE CT:  1.  Comminuted, mildly displaced and depressed fracture of the right nasal bone.  2.  Mildly depressed fracture of the anterior right and left nasal bones along the nasal bridge.  3.  Mildly displaced fracture of the anterior nasal septum.    CERVICAL SPINE CT:  1.  No fracture or posttraumatic subluxation.   CT Cervical Spine w/o Contrast    Narrative    EXAM: CT HEAD W/O CONTRAST, CT CERVICAL SPINE W/O CONTRAST, CT FACIAL BONES WITHOUT CONTRAST  LOCATION: [facility]  DATE: 3/17/2024    INDICATION: trauma  COMPARISON: None.  TECHNIQUE:   1) Routine CT Head without IV contrast. Multiplanar reformats. Dose reduction techniques were used.  2) Routine CT Facial Bones without IV contrast. Multiplanar reformats. Dose reduction techniques were used.  3) Routine CT Cervical Spine without IV contrast. Multiplanar reformats. Dose reduction techniques were used.    FINDINGS:  HEAD CT:   INTRACRANIAL CONTENTS: No intracranial hemorrhage, extraaxial collection, or mass effect.   No CT evidence of acute infarct. Encephalitis is present in the left frontal lobe. Moderate presumed chronic small vessel ischemic changes. Mild to moderate   generalized volume loss. No hydrocephalus.     OSSEOUS STRUCTURES/SOFT TISSUES: Forehead soft tissue hematoma and laceration. The calvarium is intact.     FACIAL BONE CT:  OSSEOUS STRUCTURES/SOFT TISSUES: Soft tissue hematomas present over the forehead with small subcutaneous emphysema suggestive of laceration. Additional soft tissue hematoma along the nose and right cheek. Comminuted, mildly displaced and depressed right   nasal bone fracture. Fracture is depressed by approximately 3 mm. Mildly depressed fracture of the anterior right and left nasal bones along the nasal bridge. Mildly displaced fracture of the anterior nasal septum. Multifocal dental decay. A small   vertical lucency is associated with the posterior most left mandibular molar    ORBITAL CONTENTS: Prior bilateral cataract surgery. Visualized portions of the orbits are otherwise unremarkable.    SINUSES: No paranasal sinus mucosal disease.    CERVICAL SPINE CT:   VERTEBRA: Normal vertebral body heights and alignment. No fracture or posttraumatic subluxation.     CANAL/FORAMINA: No high-grade spinal stenosis. Osseous foraminal stenosis is severe at C4-C5 and C5-C6.    PARASPINAL: No extraspinal abnormality. Visualized lung fields are clear.      Impression    IMPRESSION:  HEAD CT:  1.  No acute intracranial process.  2.  Chronic changes as above.    FACIAL BONE CT:  1.  Comminuted, mildly displaced and depressed fracture of the right nasal bone.  2.  Mildly depressed fracture of the anterior right and left nasal bones along the nasal bridge.  3.  Mildly displaced fracture of the anterior nasal septum.    CERVICAL SPINE CT:  1.  No fracture or posttraumatic subluxation.   Humerus XR, G/E 2 views, right    Narrative    EXAM: XR HUMERUS RIGHT G/E 2 VIEWS, XR SHOULDER RIGHT 2 VIEWS  LOCATION: [facility]  DATE: 3/17/2024    INDICATION: Right arm pain after injury.  COMPARISON: None.      Impression    IMPRESSION: Acute proximal humeral fracture. Mildly displaced fracture of the posterior greater tuberosity. Additional deformity and heterogeneous lucency across the surgical neck of the proximal humerus, almost certainly represents additional fracture   in this location; evaluation is somewhat limited due to internal rotation of the humerus on each of the views. CT could further evaluate and provide treatment planning, if clinically appropriate. Glenohumeral and acromial clavicular joint alignment is   normal. Elbow joint alignment is normal.   XR Chest Port 1 View    Narrative    EXAM: XR CHEST PORT 1 VIEW  LOCATION: [facility]  DATE: 3/17/2024    INDICATION: syncope and fall  COMPARISON: None.      Impression    IMPRESSION: Extensive consolidation in the left mid and lower lung, which may represent pneumonia versus pulmonary contusions/atelectasis. Possible trace left effusion. No pneumothorax. Heart size and mediastinal contours are within normal limits where   seen. Aortic arch calcifications. Unchanged comminuted proximal right humerus fracture.   XR Shoulder Right 2 Views    Narrative    EXAM: XR HUMERUS RIGHT G/E 2 VIEWS, XR SHOULDER RIGHT 2 VIEWS  LOCATION: [facility]  DATE: 3/17/2024    INDICATION: Right arm pain after injury.  COMPARISON: None.      Impression    IMPRESSION: Acute proximal humeral fracture. Mildly displaced fracture of the posterior greater tuberosity. Additional deformity and heterogeneous lucency across the surgical neck of the proximal humerus, almost certainly represents additional fracture   in this location; evaluation is somewhat limited due to internal rotation of the humerus on each of the views. CT could further evaluate and provide treatment planning, if clinically appropriate. Glenohumeral and  acromial clavicular joint alignment is   normal. Elbow joint alignment is normal.   CT Chest w Contrast    Narrative    EXAM: CT CHEST W CONTRAST  LOCATION: [facility]  DATE: 3/18/2024    INDICATION: extensive consolidation LEFT lobe, recent traumatic fall  COMPARISON: Chest x-ray 2024  TECHNIQUE: CT chest with IV contrast. Multiplanar reformats were obtained. Dose reduction techniques were used.    CONTRAST: isovue 370 75ml    FINDINGS:   LUNGS AND PLEURA: The central airways are clear. Mild bronchiectasis. Mild to moderate diffuse bilateral multilobar nonspecific pulmonary fibrotic changes with reticulation, architectural distortion and mild traction bronchiectasis. No significant   honeycombing. Mosaic lung attenuation suggesting mild air trapping. No pneumothorax, pleural fluid or pulmonary consolidation/contusion.    MEDIASTINUM/AXILLAE: No thoracic adenopathy. Normal heart size and no pericardial effusion. Small hiatal hernia.    CORONARY ARTERY CALCIFICATION: Mild.    UPPER ABDOMEN: [Nonvisualized gallbladder. Mild to moderate extrahepatic biliary duct dilatation which could be due to reservoir effect if the patient has a history of prior cholecystectomy. Mild pancreatic atrophy.    MUSCULOSKELETAL: Spinal degenerative changes. Acute comminuted right proximal humerus fracture surgical neck tuberosity. Surrounding edema.      Impression    IMPRESSION:   1.  No acute pulmonary findings. No pneumothorax or pulmonary contusion. No pneumonia, pleural effusion or hemothorax.  2.  Mild to moderate nonspecific pulmonary fibrotic changes.  3.  Acute comminuted right proximal humerus fracture.   Echocardiogram Complete     Value    LVEF  55-60%    Narrative    [id number]  [id number]  [id number]  [id number]^FANI^[place] [address]     Name: WAI GOODSON  MRN: [id number]  : 1934  Study Date: 2024 08:04 AM  Age: 89  yrs  Gender: Female  Patient Location: [facility]  Reason For Study: Syncope  Ordering Physician: LUCÍA MOHR  Performed By:      BSA: 1.6 m2  Height: 61 in  Weight: 135 lb  HR: 57  ______________________________________________________________________________  ______________________________________________________________________________  Interpretation Summary     The left ventricle is normal in size.  Left ventricular function is normal.The ejection fraction is 55-60%.  There is mild concentric left ventricular hypertrophy.  Normal right ventricle size and systolic function.  There is mild (1+) aortic regurgitation.  ______________________________________________________________________________  Left Ventricle  The left ventricle is normal in size. Left ventricular function is normal.The  ejection fraction is 55-60%. There is mild concentric left ventricular  hypertrophy. Left ventricular diastolic function is normal. No regional wall  motion abnormalities noted.     Right Ventricle  Normal right ventricle size and systolic function.     Atria  Normal left atrial size. Right atrial size is normal. There is no color  Doppler evidence of an atrial shunt.     Mitral Valve  Mitral valve leaflets appear normal. There is mild (1+) mitral regurgitation.     Tricuspid Valve  The tricuspid valve is not well visualized, but is grossly normal. This degree  of valvular regurgitation is within normal limits. Right ventricular systolic  pressure could not be approximated due to inadequate tricuspid regurgitation.     Aortic Valve  Aortic valve leaflets appear normal. There is mild (1+) aortic regurgitation.     Pulmonic Valve  The pulmonic valve is not well seen, but is grossly normal. This degree of  valvular regurgitation is within normal limits.     Vessels  The aorta root is normal. Normal size ascending aorta. IVC diameter <2.1 cm  collapsing >50% with sniff suggests a normal RA pressure of 3 mmHg.      Pericardium  There is no pericardial effusion.     ______________________________________________________________________________  MMode/2D Measurements & Calculations  IVSd: 1.3 cm  LVIDd: 4.4 cm  LVIDs: 2.7 cm  LVPWd: 1.1 cm     FS: 39.4 %  LV mass(C)d: 195.0 grams  LV mass(C)dI: 122.0 grams/m2  Ao root diam: 2.8 cm  LA dimension: 3.5 cm  asc Aorta Diam: 3.1 cm  LA/Ao: 1.2  LVOT diam: 1.9 cm  LVOT area: 2.8 cm2  Ao root diam index Ht(cm/m): 1.8  Ao root diam index BSA (cm/m2): 1.8  Asc Ao diam index BSA (cm/m2): 1.9  Asc Ao diam index Ht(cm/m): 2.0  LA Volume (BP): 31.5 ml     LA Volume Index (BP): 19.7 ml/m2  LA Volume Indexed (AL/bp): 21.4 ml/m2  RV Base: 3.8 cm  RWT: 0.52  TAPSE: 2.2 cm     Doppler Measurements & Calculations  MV E max casper: 79.1 cm/sec  MV A max casper: 110.0 cm/sec  MV E/A: 0.72  MV max P.7 mmHg  MV mean P.0 mmHg  MV V2 VTI: 35.3 cm  MVA(VTI): 1.8 cm2     MV dec slope: 234.0 cm/sec2  MV dec time: 0.34 sec  Ao V2 max: 180.0 cm/sec  Ao max P.0 mmHg  Ao V2 mean: 123.0 cm/sec  Ao mean P.0 mmHg  Ao V2 VTI: 44.2 cm  KATHLEEN(I,D): 1.4 cm2  KATHLEEN(V,D): 1.6 cm2  AI P1/2t: 603.1 msec  LV V1 max P.2 mmHg  LV V1 max: 102.0 cm/sec  LV V1 VTI: 22.5 cm  SV(LVOT): 63.8 ml  SI(LVOT): 39.9 ml/m2  PA acc time: 0.09 sec  PI end-d casper: 74.1 cm/sec  AV Casper Ratio (DI): 0.57  KATHLEEN Index (cm2/m2): 0.90  E/E' av.0  Lateral E/e': 11.5  Medial E/e': 12.5  RV S Casper: 13.2 cm/sec     ______________________________________________________________________________  Report approved by: Jay Pace 2024 10:38 AM             Discharge Medications   Current Discharge Medication List        START taking these medications    Details   oxyCODONE (ROXICODONE) 5 MG tablet Take 1 tablet (5 mg) by mouth every 6 hours as needed for severe pain (IF pain not managed with non-pharmacological and non-opioid interventions)  Qty: 15 tablet, Refills: 0    Associated Diagnoses: Closed fracture of nasal bone,  initial encounter; Closed displaced fracture of surgical neck of right humerus, unspecified fracture morphology, initial encounter           CONTINUE these medications which have CHANGED    Details   amLODIPine (NORVASC) 5 MG tablet Take 0.5 tablets (2.5 mg) by mouth daily  Qty: 30 tablet, Refills: 0    Associated Diagnoses: History of hypertension           CONTINUE these medications which have NOT CHANGED    Details   acetaminophen (TYLENOL) 325 MG tablet Take 650 mg by mouth every 8 hours as needed for mild pain      calcium carbonate-vitamin D (OSCAL) 500-5 MG-MCG tablet Take 1 tablet by mouth daily      carboxymethylcellulose PF (REFRESH PLUS) 0.5 % ophthalmic solution 2 drops as needed for dry eyes      guaiFENesin (ROBITUSSIN) 20 mg/mL liquid Take 200 mg by mouth every 6 hours as needed for cough      Multiple Vitamins-Minerals (PRESERVISION AREDS 2 PO) Take 1 tablet by mouth 2 times daily      vitamin D3 (CHOLECALCIFEROL) 50 mcg (2000 units) tablet Take 2 tablets by mouth daily           Allergies   No Known Allergies

## 2024-03-19 NOTE — UTILIZATION REVIEW
Concurrent stay review; Secondary Review Determination - [facility]        Under the authority of the Utilization Management Committee, the utilization review process indicated a secondary review on the above patient.  The review outcome is based on review of the medical records, discussions with staff, and applying clinical experience noted on the date of the review.        (x) Outpatient [facility] status is appropriate       RATIONALE FOR DETERMINATION:     Vale Solares is an 89 yr old female with HTN, chronic hyponatremia, CAD who presented with fall at [employer] and arm pain.  Identified to have right humeral fracture.  Conservative management per ortho.  Possible syncope but evaluation thus far without significant findings.  Concern for left lung consolidation however on CT, no pulmonary infiltrate.  IV abx stopped. Using minimal po oxycodone today for pain.    Cardiology consulted 3/18 and felt that her presentation was more consistent with mechanical fall and not syncope; tele monitoring has been unremarkable. Vitals stable and not hypoxic.      Medically ready for discharge; awaiting TCU bed availability    Patient delayed discharge is related to disposition, there is no medical necessity for inpatient admission at the time of this review. If there is a change in patient status, please resend for review.    The information on this document is developed by the utilization review team in order for the business office to ensure compliance.  This only denotes the appropriateness of proper admission status and does not reflect the quality of care rendered.       The definitions of Inpatient Status and Observation Status used in making the determination above are those provided in the CMS Coverage Manual, Chapter 1 and Chapter 6, section 70.4.       Sincerely,    Christina Resendez, DO

## 2024-03-19 NOTE — PLAN OF CARE
PRIMARY DIAGNOSIS: ACUTE PAIN  OUTPATIENT/OBSERVATION GOALS TO BE MET BEFORE DISCHARGE:  1. Pain Status: Improved-controlled with oral pain medications.    2. Return to near baseline physical activity: No    3. Cleared for discharge by consultants (if involved): No    Barriers to discharge: Yes       Entered by: Cathy Roberts RN 03/19/2024 2:35 AM     Please review provider order for any additional goals.   Nurse to notify provider when observation goals have been met and patient is ready for discharge.Goal Outcome Evaluation:

## 2024-03-19 NOTE — PROGRESS NOTES
Pt was confused, asking about why she is here, and where is she right now, informed crosscover.

## 2024-03-19 NOTE — PROGRESS NOTES
Acute Traumatic Pain     1.  Pain controlled with oral analgesia: Yes      2.  Vital signs stable: Yes      3.  Diagnotic testing complete: No      4.  Cleared from consultants (if applicable): No      5. Return to near baseline physical activity: No   Pt is alert and oriented x4, vitals stable.

## 2024-03-19 NOTE — PLAN OF CARE
Physical Therapy Discharge Summary    Reason for therapy discharge:    Discharged to transitional care facility.    Progress towards therapy goal(s). See goals on Care Plan in [facility] electronic health record for goal details.  Goals partially met.  Barriers to achieving goals:   discharge from facility.    Therapy recommendation(s):    Recommend continued therapies to improve overall strength, balance and mobility.       Indira Torres, PT, DPT  3/19/2024

## 2024-03-19 NOTE — PLAN OF CARE
Goal Outcome Evaluation:                    PRIMARY DIAGNOSIS: ACUTE PAIN  OUTPATIENT/OBSERVATION GOALS TO BE MET BEFORE DISCHARGE:  1. Pain Status: Improved-controlled with oral pain medications.    2. Return to near baseline physical activity: No    3. Cleared for discharge by consultants (if involved): No    Discharge Planner Nurse   Barriers to discharge: Yes       Entered by: Cathy Roberts RN 03/19/2024 7:23 AM   Pt is alert and oriented x4, vitals stable, is in RA, complains of pain so oxycodone given (see MAR), uses commode , is in low saturated fat diet, pt refused PCD pump as pt says it hurts on PCD site.  Please review provider order for any additional goals.   Nurse to notify provider when observation goals have been met and patient is ready for discharge.

## 2024-03-19 NOTE — PLAN OF CARE
Occupational Therapy Discharge Summary    Reason for therapy discharge:    Discharged to transitional care facility.    Progress towards therapy goal(s). See goals on Care Plan in [facility] electronic health record for goal details.  Goals not met.  Barriers to achieving goals:   discharge from facility.    Therapy recommendation(s):    Continued therapy is recommended.  Rationale/Recommendations:  maximize ADL IND.

## 2024-03-19 NOTE — PLAN OF CARE
Goal Outcome Evaluation:      Plan of Care Reviewed With: patient    Overall Patient Progress: improving    Patient discharged to transitional care unit  via Health plan transportation. - patient will be discharging to [facility]  Accompanied by son and staff.  Discharge instructions were reviewed with , opportunity offered to ask questions.    Prescriptions sent with patient to discharge facility .  Access discontinued: Yes  Care plan and education discontinued: Yes  Belongings were sent home with patient/family:  Clothing: Shirt(s):  , Pants:  , Underclothes:  , and Outerwear:   and Shoes:   .

## 2024-03-19 NOTE — PROGRESS NOTES
"Short shift [id number]   PRIMARY DIAGNOSIS: Fall with closed displaced fx Face bruising / abrasion(s)     OUTPATIENT/OBSERVATION GOALS TO BE MET BEFORE DISCHARGE  1. Orthostatic performed: No    2. Tolerating PO medications: Yes    3. Return to near baseline physical activity: Yes    4. Cleared for discharge by consultants (if involved): No    Discharge Planner Nurse   Safe discharge environment identified: Yes  Barriers to discharge: No       Entered by: Maida Sexton RN 03/18/2024 8:02 PM     Please review provider order for any additional goals.   Nurse to notify provider when observation goals have been met and patient is ready for discharge.    BP (!) 143/65 (BP Location: Left arm, Patient Position: Semi-Carlos's, Cuff Size: Adult Regular)   Pulse 61   Temp 98  F (36.7  C) (Oral)   Resp 18   Ht 1.549 m (5' 1\")   Wt 61.2 kg (135 lb)   SpO2 97%   BMI 25.51 kg/m      Pt is A&O x4 VSS   Denies pain at this time, had been given scheduled and PRN pain medications prior to this short shift. Family had been bedside during this shift.  Pt appears comfortable, denies pain during bedside shift change.  Plans to discharge to TCU 03/19/2024.  Autumn Sexton RN   "

## 2024-03-19 NOTE — PROGRESS NOTES
Care Management Discharge Note    Discharge Date: 03/19/2024       Discharge Disposition:  [facility]   Discharge Services:  n/a  Discharge DME: n/a     Discharge Transportation: family or friend will provide    Private pay costs discussed: insurance costs co-pays    Does the patient's insurance plan have a 3 day qualifying hospital stay waiver?  Yes     Which insurance plan 3 day waiver is available? Alternative insurance waiver    Will the waiver be used for post-acute placement? Yes    PAS Confirmation Code: [id number]  Patient/family educated on Medicare website which has current facility and service quality ratings:      Education Provided on the Discharge Plan:  yes  Persons Notified of Discharge Plans: yes  Patient/Family in Agreement with the Plan: yes      Handoff Referral Completed: Yes    Additional Information:  [facility] not able to accommodate insurance as it appears to be an out of state plan. MIKE working with [facility] who is running for auth. Care management following.  12:50 PM    [facility] received); bed secured. Family needing transport.  [place]  ride set for 1730 with a  window between [id number] (agreeable to private pay). All parties aware and agreeable to discharge plan. PAS DONE.  3:04 PM    Brenna Kjellberg, BSW LSW  3/19/2024

## 2024-03-20 ENCOUNTER — DOCUMENTATION ONLY (OUTPATIENT)
Dept: GERIATRICS | Facility: CLINIC | Age: 89
End: 2024-03-20
Payer: COMMERCIAL

## 2024-03-20 DIAGNOSIS — S02.2XXA CLOSED FRACTURE OF NASAL BONE, INITIAL ENCOUNTER: ICD-10-CM

## 2024-03-20 DIAGNOSIS — S42.211A CLOSED DISPLACED FRACTURE OF SURGICAL NECK OF RIGHT HUMERUS, UNSPECIFIED FRACTURE MORPHOLOGY, INITIAL ENCOUNTER: ICD-10-CM

## 2024-03-20 RX ORDER — OXYCODONE HYDROCHLORIDE 5 MG/1
5 TABLET ORAL EVERY 6 HOURS PRN
Qty: 30 TABLET | Refills: 0 | Status: SHIPPED | OUTPATIENT
Start: 2024-03-20 | End: 2024-03-27

## 2024-03-22 ENCOUNTER — TRANSITIONAL CARE UNIT VISIT (OUTPATIENT)
Dept: GERIATRICS | Facility: CLINIC | Age: 89
End: 2024-03-22
Payer: COMMERCIAL

## 2024-03-22 VITALS
HEART RATE: 82 BPM | WEIGHT: 169.5 LBS | TEMPERATURE: 96.9 F | BODY MASS INDEX: 32 KG/M2 | DIASTOLIC BLOOD PRESSURE: 66 MMHG | HEIGHT: 61 IN | OXYGEN SATURATION: 98 % | SYSTOLIC BLOOD PRESSURE: 136 MMHG | RESPIRATION RATE: 18 BRPM
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
## 2024-03-22 DIAGNOSIS — I10 PRIMARY HYPERTENSION: ICD-10-CM

## 2024-03-22 DIAGNOSIS — Z86.79 HISTORY OF HYPERTENSION: ICD-10-CM

## 2024-03-22 DIAGNOSIS — S42.214D CLOSED NONDISPLACED FRACTURE OF SURGICAL NECK OF RIGHT HUMERUS WITH ROUTINE HEALING, UNSPECIFIED FRACTURE MORPHOLOGY, SUBSEQUENT ENCOUNTER: ICD-10-CM

## 2024-03-22 DIAGNOSIS — S02.2XXD CLOSED FRACTURE OF NASAL BONE WITH ROUTINE HEALING, SUBSEQUENT ENCOUNTER: Primary | ICD-10-CM

## 2024-03-22 DIAGNOSIS — Z47.89 ORTHOPEDIC AFTERCARE: ICD-10-CM

## 2024-03-22 DIAGNOSIS — R93.89 OPACITY NOTED ON IMAGING STUDY: ICD-10-CM

## 2024-03-22 PROBLEM — E87.1 CHRONIC HYPONATREMIA: Status: ACTIVE | Noted: 2018-12-02

## 2024-03-22 PROBLEM — G30.1 LATE ONSET ALZHEIMER'S DISEASE WITHOUT BEHAVIORAL DISTURBANCE (H): Status: ACTIVE | Noted: 2019-05-31

## 2024-03-22 PROBLEM — J30.1 SEASONAL ALLERGIC RHINITIS DUE TO POLLEN: Status: ACTIVE | Noted: 2017-05-16

## 2024-03-22 PROBLEM — E55.9 VITAMIN D DEFICIENCY: Status: ACTIVE | Noted: 2018-05-17

## 2024-03-22 PROBLEM — F02.80 LATE ONSET ALZHEIMER'S DISEASE WITHOUT BEHAVIORAL DISTURBANCE (H): Status: ACTIVE | Noted: 2019-05-31

## 2024-03-22 LAB
BACTERIA BLD CULT: NO GROWTH
BACTERIA BLD CULT: NO GROWTH

## 2024-03-22 PROCEDURE — 99309 SBSQ NF CARE MODERATE MDM 30: CPT | Performed by: NURSE PRACTITIONER

## 2024-03-22 RX ORDER — AMLODIPINE BESYLATE 5 MG/1
2.5 TABLET ORAL AT BEDTIME
Status: SHIPPED
Start: 2024-03-22

## 2024-03-22 RX ORDER — ACETAMINOPHEN 500 MG
1000 TABLET ORAL 2 TIMES DAILY
COMMUNITY

## 2024-03-22 NOTE — PROGRESS NOTES
"[facility] Admission  PCP & CLINIC: Rober WADSWORTHPrabhu Abcecilio, [address] [facility] [address]  Chief Complaint   Patient presents with    Hospital F/U   [place] MRN: [id number]. Place of Service where encounter took place:  [facility] (TCU) [4002] Vale Solares  is a 89 year old  (6/9/1934), admitted to the above facility from  [facility]. Hospital stay 3/17 through 3/19. Admitted to this facility for  rehab, medical management, and nursing care. HPI information obtained from: facility chart records, facility staff, patient report, [facility] chart review, and Care Everywhere [facility] chart review.     Brief Summary of Hospital Course: Vale presented to [facility] on 3/17 after a fall at [employer]. She was found to have a nasal fracture and right humeral fracture. Conservative management recommended. Incidental non-infectious lung opacity seen on CT, recommending outpatient pulm consult.     Updates since admission to transitional care unit: Vale presented to TCU on 3/19 s/p the above hospitalization. Today, Vale laughs and says she feels \"terrible.\" She's sore, but the sling to her arm is helping.  She denies any dizziness, headaches, nasal drainage or sore throat.  She says she is just going to \"leave her nose alone.\"  Her ENT visit has not yet been scheduled.  She denies any significant shortness of breath, chest pain, palpitations.  She feels like she has a little bit more swelling in her lower extremities than usual.  Her appetite is fair, but she does not eat as much as she used to.  She denies any nausea or heartburn.  Her bowels are moving well, and she denies any bladder symptoms.  She wants to go home as soon as possible.    CODE STATUS/ADVANCE DIRECTIVES DISCUSSION:DNR/DNI. Patient's living condition: lives in an assisted living facility. ALLERGIES: Patient has no known allergies. PAST MEDICAL HISTORY:  has no past medical history on file.. PAST SURGICAL " "HISTORY:   has no past surgical history on file.. FAMILY HISTORY: family history is not on file.. SOCIAL HISTORY:     Post Discharge Medication Reconciliation Status: discharge medications reconciled and changed, per note/orders.  Current Outpatient Medications   Medication Sig Dispense Refill    acetaminophen (TYLENOL) 500 MG tablet Take 1,000 mg by mouth 2 times daily +650mg PO BID PRN      amLODIPine (NORVASC) 5 MG tablet Take 0.5 tablets (2.5 mg) by mouth at bedtime      calcium carbonate-vitamin D (OSCAL) 500-5 MG-MCG tablet Take 1 tablet by mouth daily      Multiple Vitamins-Minerals (PRESERVISION AREDS 2 PO) Take 1 tablet by mouth 2 times daily      oxyCODONE (ROXICODONE) 5 MG tablet Take 1 tablet (5 mg) by mouth every 6 hours as needed for severe pain (IF pain not managed with non-pharmacological and non-opioid interventions) 30 tablet 0    vitamin D3 (CHOLECALCIFEROL) 50 mcg (2000 units) tablet Take 2 tablets by mouth daily       ROS: 10 point ROS of systems including Constitutional, Eyes, Respiratory, Cardiovascular, Gastroenterology, Genitourinary, Integumentary, Musculoskeletal, Psychiatric were all negative except for pertinent positives noted in my HPI.    Vitals: /66   Pulse 82   Temp 96.9  F (36.1  C)   Resp 18   Ht 1.549 m (5' 1\")   Wt 76.9 kg (169 lb 8 oz)   SpO2 98%   BMI 32.03 kg/m    Exam:  GENERAL APPEARANCE: Alert, in no distress, cooperative.   ENT: Mouth/posterior oropharynx intact w/ moist mucous membranes, hearing acuity [place]. She has periorbital bruising bilaterally and mild bruising to her nose.   EYES: EOM, conjunctivae, lids, pupils and irises normal, PERRL2.   RESP: Respiratory effort good, no respiratory distress, Lung sounds clear. On RA.   CV: Auscultation of heart reveals S1, S2, rate and rhythm regular, no murmur, no rub or gallop, Edema 1-2+ BLE. Peripheral pulses are 2+.  ABDOMEN: Normal bowel sounds, soft, non-tender abdomen, and no masses palpated.  SKIN: " Inspection/Palpation of skin and subcutaneous tissue baseline w/ fragility. No wounds/rashes noted other than facial bruising described above.   NEURO: CN II-XII at patient's baseline, sensation baseline PPS.  PSYCH: Insight, judgement, and memory are baseline, affect and mood are happy/engaged.    Lab/Diagnostic data: Recent labs in [facility] reviewed by me today.     ASSESSMENT/PLAN:  Closed fracture of nasal bone with routine healing, subsequent encounter  Closed nondisplaced fracture of surgical neck of right humerus with routine healing, unspecified fracture morphology, subsequent encounter  Orthopedic aftercare  Fall, subsequent encounter  Primary hypertension  Opacity noted on imaging study  Acute on chronic. Complex/Tenuous.   Provider reviewed records from hospitalization, facility, and interpreted most recent imaging/lab work, and vital signs.  Provider coordinated care with nursing, who will need to arrange ENT and orthopedics follow-ups.  To mitigate lower extremity edema and potential for future fall, will trial changing Norvasc to at bedtime dosing.  Fall sounds mechanical, but will also discontinue guaifenesin and refresh to simplify care plan.  Noting some bothersome right upper extremity pain from fracture.  Will schedule Tylenol to mitigate use of opioids.  Will therefore adjust PRN Tylenol dosing for safety.  Blood work inpatient did not reveal any metabolic disturbance, and was done very recently.  No need for further testing related to this visit, but may need further testing should other symptoms arise.  Discharge disposition is likely within 1 week given patient lives in assisted living and may be able to increase her services and return home.  Nursing has already coordinated care with the assisted living facility.   Follow up w/in 1 week or as needed.    Orders:  Change Norvasc to HS dosing. Dx: HTN/fall.  Discontinue Guaifenesin.  Discontinue Refresh.  Tylenol 1000mg PO BID. Dx: Right arm pain.    Change PRN Tylenol to 650mg PO BID PRN. Dx: pain/fever.     Electronically signed by:  Dr. Macy Modi, JIMI CNP DNP

## 2024-03-22 NOTE — LETTER
"    3/22/2024        RE: Vale Solares  [facility] [id number]  [place] [address]        [facility] TCU Admission  [facility]INIC: [facility] [address] [facility] [place] [address]  Chief Complaint   Patient presents with     Hospital F/U   [place] MRN: [id number]. Place of Service where encounter took place:  [facility] (TCU) [4002] Vale Solares  is a 89 year old  (6/9/1934), admitted to the above facility from  [facility]. Hospital stay 3/17 through 3/19. Admitted to this facility for  rehab, medical management, and nursing care. HPI information obtained from: facility chart records, facility staff, patient report, [facility] chart review, and Care Everywhere [facility] chart review.     Brief Summary of Hospital Course: Vale presented to [facility] on 3/17 after a fall at [employer]. She was found to have a nasal fracture and right humeral fracture. Conservative management recommended. Incidental non-infectious lung opacity seen on CT, recommending outpatient pulm consult.     Updates since admission to transitional care unit: Vale presented to TCU on 3/19 s/p the above hospitalization. Today, Vale laughs and says she feels \"terrible.\" She's sore, but the sling to her arm is helping.  She denies any dizziness, headaches, nasal drainage or sore throat.  She says she is just going to \"leave her nose alone.\"  Her ENT visit has not yet been scheduled.  She denies any significant shortness of breath, chest pain, palpitations.  She feels like she has a little bit more swelling in her lower extremities than usual.  Her appetite is fair, but she does not eat as much as she used to.  She denies any nausea or heartburn.  Her bowels are moving well, and she denies any bladder symptoms.  She wants to go home as soon as possible.    CODE STATUS/ADVANCE DIRECTIVES DISCUSSION:DNR/DNI. Patient's living condition: lives in an assisted living " "facility. ALLERGIES: Patient has no known allergies. PAST MEDICAL HISTORY:  has no past medical history on file.. PAST SURGICAL HISTORY:   has no past surgical history on file.. FAMILY HISTORY: family history is not on file.. SOCIAL HISTORY:     Post Discharge Medication Reconciliation Status: discharge medications reconciled and changed, per note/orders.  Current Outpatient Medications   Medication Sig Dispense Refill     acetaminophen (TYLENOL) 500 MG tablet Take 1,000 mg by mouth 2 times daily +650mg PO BID PRN       amLODIPine (NORVASC) 5 MG tablet Take 0.5 tablets (2.5 mg) by mouth at bedtime       calcium carbonate-vitamin D (OSCAL) 500-5 MG-MCG tablet Take 1 tablet by mouth daily       Multiple Vitamins-Minerals (PRESERVISION AREDS 2 PO) Take 1 tablet by mouth 2 times daily       oxyCODONE (ROXICODONE) 5 MG tablet Take 1 tablet (5 mg) by mouth every 6 hours as needed for severe pain (IF pain not managed with non-pharmacological and non-opioid interventions) 30 tablet 0     vitamin D3 (CHOLECALCIFEROL) 50 mcg (2000 units) tablet Take 2 tablets by mouth daily       ROS: 10 point ROS of systems including Constitutional, Eyes, Respiratory, Cardiovascular, Gastroenterology, Genitourinary, Integumentary, Musculoskeletal, Psychiatric were all negative except for pertinent positives noted in my HPI.    Vitals: /66   Pulse 82   Temp 96.9  F (36.1  C)   Resp 18   Ht 1.549 m (5' 1\")   Wt 76.9 kg (169 lb 8 oz)   SpO2 98%   BMI 32.03 kg/m    Exam:  GENERAL APPEARANCE: Alert, in no distress, cooperative.   ENT: Mouth/posterior oropharynx intact w/ moist mucous membranes, hearing acuity [place]. She has periorbital bruising bilaterally and mild bruising to her nose.   EYES: EOM, conjunctivae, lids, pupils and irises normal, PERRL2.   RESP: Respiratory effort good, no respiratory distress, Lung sounds clear. On RA.   CV: Auscultation of heart reveals S1, S2, rate and rhythm regular, no murmur, no rub or gallop, " Edema 1-2+ BLE. Peripheral pulses are 2+.  ABDOMEN: Normal bowel sounds, soft, non-tender abdomen, and no masses palpated.  SKIN: Inspection/Palpation of skin and subcutaneous tissue baseline w/ fragility. No wounds/rashes noted other than facial bruising described above.   NEURO: CN II-XII at patient's baseline, sensation baseline PPS.  PSYCH: Insight, judgement, and memory are baseline, affect and mood are happy/engaged.    Lab/Diagnostic data: Recent labs in [facility] reviewed by me today.     ASSESSMENT/PLAN:  Closed fracture of nasal bone with routine healing, subsequent encounter  Closed nondisplaced fracture of surgical neck of right humerus with routine healing, unspecified fracture morphology, subsequent encounter  Orthopedic aftercare  Fall, subsequent encounter  Primary hypertension  Opacity noted on imaging study  Acute on chronic. Complex/Tenuous.   Provider reviewed records from hospitalization, facility, and interpreted most recent imaging/lab work, and vital signs.  Provider coordinated care with nursing, who will need to arrange ENT and orthopedics follow-ups.  To mitigate lower extremity edema and potential for future fall, will trial changing Norvasc to at bedtime dosing.  Fall sounds mechanical, but will also discontinue guaifenesin and refresh to simplify care plan.  Noting some bothersome right upper extremity pain from fracture.  Will schedule Tylenol to mitigate use of opioids.  Will therefore adjust PRN Tylenol dosing for safety.  Blood work inpatient did not reveal any metabolic disturbance, and was done very recently.  No need for further testing related to this visit, but may need further testing should other symptoms arise.  Discharge disposition is likely within 1 week given patient lives in assisted living and may be able to increase her services and return home.  Nursing has already coordinated care with the assisted living facility.   Follow up w/in 1 week or as needed.    Orders:  Change  Norvasc to HS dosing. Dx: HTN/fall.  Discontinue Guaifenesin.  Discontinue Refresh.  Tylenol 1000mg PO BID. Dx: Right arm pain.   Change PRN Tylenol to 650mg PO BID PRN. Dx: pain/fever.     Electronically signed by:  Dr. Macy Modi, APRN CNP DNP                        Sincerely,        Macy Modi, JIMI CNP

## 2024-03-26 ENCOUNTER — DISCHARGE SUMMARY NURSING HOME (OUTPATIENT)
Dept: GERIATRICS | Facility: CLINIC | Age: 89
End: 2024-03-26
Payer: COMMERCIAL

## 2024-03-26 VITALS
TEMPERATURE: 97.7 F | HEIGHT: 61 IN | WEIGHT: 169.5 LBS | DIASTOLIC BLOOD PRESSURE: 61 MMHG | OXYGEN SATURATION: 94 % | HEART RATE: 64 BPM | BODY MASS INDEX: 32 KG/M2 | RESPIRATION RATE: 13 BRPM | SYSTOLIC BLOOD PRESSURE: 128 MMHG

## 2024-03-26 DIAGNOSIS — S42.214D CLOSED NONDISPLACED FRACTURE OF SURGICAL NECK OF RIGHT HUMERUS WITH ROUTINE HEALING, UNSPECIFIED FRACTURE MORPHOLOGY, SUBSEQUENT ENCOUNTER: ICD-10-CM
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
## 2024-03-26 DIAGNOSIS — I10 PRIMARY HYPERTENSION: ICD-10-CM

## 2024-03-26 DIAGNOSIS — R93.89 OPACITY NOTED ON IMAGING STUDY: ICD-10-CM

## 2024-03-26 DIAGNOSIS — Z47.89 ORTHOPEDIC AFTERCARE: ICD-10-CM

## 2024-03-26 DIAGNOSIS — F02.80 LATE ONSET ALZHEIMER'S DISEASE WITHOUT BEHAVIORAL DISTURBANCE (H): ICD-10-CM

## 2024-03-26 DIAGNOSIS — G30.1 LATE ONSET ALZHEIMER'S DISEASE WITHOUT BEHAVIORAL DISTURBANCE (H): ICD-10-CM

## 2024-03-26 DIAGNOSIS — S02.2XXD CLOSED FRACTURE OF NASAL BONE WITH ROUTINE HEALING, SUBSEQUENT ENCOUNTER: Primary | ICD-10-CM

## 2024-03-26 PROCEDURE — 99316 NF DSCHRG MGMT 30 MIN+: CPT | Performed by: NURSE PRACTITIONER

## 2024-03-26 NOTE — LETTER
3/26/2024        RE: Vale Solares  [facility] [address] [address]        [facility] TCU DISCHARGE SUMMARY  PATIENT'S NAME: Vale Solares : 1934 MRN: [id number] Place of Service where encounter took place:  [facility] [place] (TCU) [[id number]] PRIMARY CARE PROVIDER AND CLINIC RESPONSIBLE AFTER TRANSFER: [facility],  [facility] [id number]. [facility] Provider     Transferring providers: Dr. Macy Modi, APRN CNP DNP.  Recent Hospitalization/ED: [facility] stay 3/17 to 3/19/24.  Date of TCU Admission: 3/19/24  Date of TCU (anticipated) Discharge: 3/29/24.  Discharged to: previous assisted living  Cognitive Scores: SLUMS 16/30.   Physical Function: Ambluating w/o assistive device. TUG 16 seconds. REYES 42/56.   DME: None.  CODE STATUS/ADVANCE DIRECTIVES DISCUSSION:  Full Code.  ALLERGIES: Patient has no known allergies.    DISCHARGE DIAGNOSIS/NURSING FACILITY COURSE:   Late onset Alzheimer's disease without behavioral disturbance (H)  Closed fracture of nasal bone with routine healing, subsequent encounter  Closed nondisplaced fracture of surgical neck of right humerus with routine healing, unspecified fracture morphology, subsequent encounter  Orthopedic aftercare  Fall, subsequent encounter  Primary hypertension  Opacity noted on imaging study    Hospitalization: Vale presented to [facility] on 3/17 after a fall at [facility]. She was found to have a nasal fracture and right humeral fracture. Conservative management recommended. Incidental non-infectious lung opacity seen on CT, recommending outpatient pulm consult.      Rehab: Vale presented to TCU on 3/19 s/p the above hospitalization. She immediately wanted to return home with services and felt that she was already meeting her goals. She was evaluated by therapy, and with some modifications she was able to resume her usual function w/ SBA or 1-assist (given NWB to RUE). To help mitigate  "future falls, we switched Norvasc to HS dosing. We simplified care plan by eliminating guaifenesin, refresh, and we tried to mitigate Oxycodone use w/ scheduled ES Tylenol.     Today, Vale is doing well. She still has some mild pain in her right upper arm, more toward her shoulder. She denies numbness/tingling and feels her swelling has gone down. She denies SOB, CP, palpitations, headache, dizziness. She hasn't had vision changes or new/unusual nasal drainage. Her nose and eyes done hurt and bruising improved. She denies constipation/diarrhea.     Recommendations to PCP:  Pulm consult and possible chest CT.   ENT follow up.   GDR scheduled Tylenol on follow up.     Past Medical History:  has no past medical history on file.  Discharge Medications:  Current Outpatient Medications   Medication Sig Dispense Refill     acetaminophen (TYLENOL) 500 MG tablet Take 1,000 mg by mouth 2 times daily +650mg PO BID PRN       amLODIPine (NORVASC) 5 MG tablet Take 0.5 tablets (2.5 mg) by mouth at bedtime       calcium carbonate-vitamin D (OSCAL) 500-5 MG-MCG tablet Take 1 tablet by mouth daily       Multiple Vitamins-Minerals (PRESERVISION AREDS 2 PO) Take 1 tablet by mouth 2 times daily       vitamin D3 (CHOLECALCIFEROL) 50 mcg (2000 units) tablet Take 2 tablets by mouth daily       ROS: Limited secondary to cognitive impairment but today pt reports the above and 4 point ROS including Respiratory, CV, GI and , other than that noted in the HPI, is negative.    Physical Exam: Vitals: /61   Pulse 64   Temp 97.7  F (36.5  C)   Resp 13   Ht 1.549 m (5' 1\")   Wt 76.9 kg (169 lb 8 oz)   SpO2 94%   BMI 32.03 kg/m    GENERAL APPEARANCE: Alert, in no distress, cooperative.   ENT: Mouth/posterior oropharynx intact w/ moist mucous membranes, hearing acuity [place]. Mild periorbital bruising and nasal bridge bruising still present.   EYES: EOM, conjunctivae, lids, pupils and irises normal, PERRL2.   RESP: Respiratory effort " good, no respiratory distress, On RA.   CV: Auscultation of heart reveals S1, S2, rate and rhythm regular, no murmur, no rub or gallop, Edema 0+ BLE. Peripheral pulses are 2+.  ABDOMEN: Normal bowel sounds, soft, non-tender abdomen, and no masses palpated.  SKIN: Inspection/Palpation of skin and subcutaneous tissue baseline w/ fragility. No wounds/rashes noted.   NEURO/MSK: CN II-XII at patient's baseline, sensation baseline PPS. RUE sling.   PSYCH: Insight, judgement, and memory are baseline impaired, affect and mood are happy/engaged.    Facility Labs: Labs done in SNF are in [facility] EPIC. Please refer to them using [employer]/Care Everywhere.    DISCHARGE PLAN:  Follow up labs: No labs orders/due  Medical Follow Up:  Follow up with primary care provider in 1-4 weeks  MTM referral needed: Yes, recommended.   Current [facility] scheduled appointments:  None.  Discharge Services: Home Care:  Occupational Therapy, Physical Therapy, Registered Nurse, and Home Health Aide    Orders:  Discontinue Oxycodone.     TOTAL DISCHARGE TIME:   Greater than 30 minutes    Electronically signed by:  Dr. Macy Modi, APRN CNP DNP  ______________      Documentation of Face-to-Face and Certification for Home Health Services   Patient: Vale Solares YOB: 1934  MRN: [id number]  Today's Date: 3/26/2024  I certify that patient: Vale Solares is under my care and that I had a face-to-face encounter that meets the provider  face-to-face encounter requirements with this patient on: 3/26/2024. This encounter with the patient was in whole, or in part, for the following medical condition, which is the primary reason for home health care: right arm fx. I certify that, based on my findings, the following services are medically necessary home health services: Nursing, Occupational Therapy, and Physical Therapy. My clinical findings support the need for the above services because: Nurse is needed: To provide assessment and  oversight required in the home to assure adherence to the medical plan due to: cognitive impairment.., Occupational Therapy Services are needed to assess and treat cognitive ability and address ADL safety due to impairment in mobility., and Physical Therapy Services are needed to assess and treat the following functional impairments: mobility.    Further, I certify that my clinical findings support that this patient is homebound (i.e. absences from home require considerable and taxing effort and are for medical reasons or [employer] services or infrequently or of short duration when for other reasons) because: Requires assistance of another person or specialized equipment to access medical services because patient: Range of motion limitations prevents ability to exit home safely...    Based on the above findings. I certify that this patient is confined to the home and needs intermittent skilled nursing care, physical therapy and/or speech therapy.  The patient is under my care, and I have initiated the establishment of the plan of care.  This patient will be followed by a provider who will periodically review the plan of care.    Provider to give follow up care: Rober Holloway    Responsible Medicare certified PEC[employer] Provider: JIMI Hicks CNP DNP  Provider Signature: See electronic signature associated with these discharge orders.  Date: 3/26/2024               Sincerely,        JIMI Schwarz CNP

## 2024-03-26 NOTE — PROGRESS NOTES
[facility] TCU DISCHARGE SUMMARY  PATIENT'S NAME: Vale Solares : 1934 MRN: [id number] Place of Service where encounter took place:  [facility] (TCU) [[id number]] PRIMARY CARE PROVIDER AND CLINIC RESPONSIBLE AFTER TRANSFER: Rober Holloway,  [facility] [place] [address]. [facility] Provider     Transferring providers: Dr. Macy Modi, APRN CNP DNP.  Recent Hospitalization/ED: [facility] stay 3/17 to 3/19/24.  Date of TCU Admission: 3/19/24  Date of TCU (anticipated) Discharge: 3/29/24.  Discharged to: previous assisted living  Cognitive Scores: SLUMS .   Physical Function: Ambluating w/o assistive device. TUG 16 seconds. REYES 42/56.   DME: None.  CODE STATUS/ADVANCE DIRECTIVES DISCUSSION:  Full Code.  ALLERGIES: Patient has no known allergies.    DISCHARGE DIAGNOSIS/NURSING FACILITY COURSE:   Late onset Alzheimer's disease without behavioral disturbance (H)  Closed fracture of nasal bone with routine healing, subsequent encounter  Closed nondisplaced fracture of surgical neck of right humerus with routine healing, unspecified fracture morphology, subsequent encounter  Orthopedic aftercare  Fall, subsequent encounter  Primary hypertension  Opacity noted on imaging study    Hospitalization: Vale presented to [facility] on 3/17 after a fall at [employer]. She was found to have a nasal fracture and right humeral fracture. Conservative management recommended. Incidental non-infectious lung opacity seen on CT, recommending outpatient pulm consult.      Rehab: Vale presented to TCU on 3/19 s/p the above hospitalization. She immediately wanted to return home with services and felt that she was already meeting her goals. She was evaluated by therapy, and with some modifications she was able to resume her usual function w/ SBA or 1-assist (given NWB to RUE). To help mitigate future falls, we switched Norvasc to HS dosing. We simplified care plan by eliminating guaifenesin, refresh, and we tried to  "mitigate Oxycodone use w/ scheduled ES Tylenol.     Today, Vale is doing well. She still has some mild pain in her right upper arm, more toward her shoulder. She denies numbness/tingling and feels her swelling has gone down. She denies SOB, CP, palpitations, headache, dizziness. She hasn't had vision changes or new/unusual nasal drainage. Her nose and eyes done hurt and bruising improved. She denies constipation/diarrhea.     Recommendations to PCP:  Pulm consult and possible chest CT.   ENT follow up.   GDR scheduled Tylenol on follow up.     Past Medical History:  has no past medical history on file.  Discharge Medications:  Current Outpatient Medications   Medication Sig Dispense Refill    acetaminophen (TYLENOL) 500 MG tablet Take 1,000 mg by mouth 2 times daily +650mg PO BID PRN      amLODIPine (NORVASC) 5 MG tablet Take 0.5 tablets (2.5 mg) by mouth at bedtime      calcium carbonate-vitamin D (OSCAL) 500-5 MG-MCG tablet Take 1 tablet by mouth daily      Multiple Vitamins-Minerals (PRESERVISION AREDS 2 PO) Take 1 tablet by mouth 2 times daily      vitamin D3 (CHOLECALCIFEROL) 50 mcg (2000 units) tablet Take 2 tablets by mouth daily       ROS: Limited secondary to cognitive impairment but today pt reports the above and 4 point ROS including Respiratory, CV, GI and , other than that noted in the HPI, is negative.    Physical Exam: Vitals: /61   Pulse 64   Temp 97.7  F (36.5  C)   Resp 13   Ht 1.549 m (5' 1\")   Wt 76.9 kg (169 lb 8 oz)   SpO2 94%   BMI 32.03 kg/m    GENERAL APPEARANCE: Alert, in no distress, cooperative.   ENT: Mouth/posterior oropharynx intact w/ moist mucous membranes, hearing acuity [place]. Mild periorbital bruising and nasal bridge bruising still present.   EYES: EOM, conjunctivae, lids, pupils and irises normal, PERRL2.   RESP: Respiratory effort good, no respiratory distress, On RA.   CV: Auscultation of heart reveals S1, S2, rate and rhythm regular, no murmur, no rub or " gallop, Edema 0+ BLE. Peripheral pulses are 2+.  ABDOMEN: Normal bowel sounds, soft, non-tender abdomen, and no masses palpated.  SKIN: Inspection/Palpation of skin and subcutaneous tissue baseline w/ fragility. No wounds/rashes noted.   NEURO/MSK: CN II-XII at patient's baseline, sensation baseline PPS. RUE sling.   PSYCH: Insight, judgement, and memory are baseline impaired, affect and mood are happy/engaged.    Facility Labs: Labs done in SNF are in [facility] EPIC. Please refer to them using EPIC/Care Everywhere.    DISCHARGE PLAN:  Follow up labs: No labs orders/due  Medical Follow Up:  Follow up with primary care provider in 1-4 weeks  MT referral needed: Yes, recommended.   Current [facility] scheduled appointments:  None.  Discharge Services: Home Care:  Occupational Therapy, Physical Therapy, Registered Nurse, and Home Health Aide    Orders:  Discontinue Oxycodone.     TOTAL DISCHARGE TIME:   Greater than 30 minutes    Electronically signed by:  Dr. Macy Modi, JIMI CNP DNP  ______________      Documentation of Face-to-Face and Certification for Home Health Services   Patient: Vale Solares YOB: 1934  MRN: [id number]  Today's Date: 3/26/2024  I certify that patient: Vale Solares is under my care and that I had a face-to-face encounter that meets the provider  face-to-face encounter requirements with this patient on: 3/26/2024. This encounter with the patient was in whole, or in part, for the following medical condition, which is the primary reason for home health care: right arm fx. I certify that, based on my findings, the following services are medically necessary home health services: Nursing, Occupational Therapy, and Physical Therapy. My clinical findings support the need for the above services because: Nurse is needed: To provide assessment and oversight required in the home to assure adherence to the medical plan due to: cognitive impairment.., Occupational Therapy  Services are needed to assess and treat cognitive ability and address ADL safety due to impairment in mobility., and Physical Therapy Services are needed to assess and treat the following functional impairments: mobility.    Further, I certify that my clinical findings support that this patient is homebound (i.e. absences from home require considerable and taxing effort and are for medical reasons or [employer] services or infrequently or of short duration when for other reasons) because: Requires assistance of another person or specialized equipment to access medical services because patient: Range of motion limitations prevents ability to exit home safely...    Based on the above findings. I certify that this patient is confined to the home and needs intermittent skilled nursing care, physical therapy and/or speech therapy.  The patient is under my care, and I have initiated the establishment of the plan of care.  This patient will be followed by a provider who will periodically review the plan of care.    Provider to give follow up care: Rober Holloway    Responsible Medicare certified PECOS Provider: Dr. Macy Modi, APRN CNP DNP  Provider Signature: See electronic signature associated with these discharge orders.  Date: 3/26/2024

## 2024-04-04 ENCOUNTER — TRANSFERRED RECORDS (OUTPATIENT)
Dept: HEALTH INFORMATION MANAGEMENT | Facility: CLINIC | Age: 89
End: 2024-04-04
Payer: COMMERCIAL

## 2024-05-02 ENCOUNTER — TRANSFERRED RECORDS (OUTPATIENT)
Dept: HEALTH INFORMATION MANAGEMENT | Facility: CLINIC | Age: 89
End: 2024-05-02
Payer: COMMERCIAL

## 2024-06-06 ENCOUNTER — TRANSFERRED RECORDS (OUTPATIENT)
Dept: HEALTH INFORMATION MANAGEMENT | Facility: CLINIC | Age: 89
End: 2024-06-06
Payer: COMMERCIAL

## 2024-07-08 ENCOUNTER — TRANSFERRED RECORDS (OUTPATIENT)
Dept: HEALTH INFORMATION MANAGEMENT | Facility: CLINIC | Age: 89
End: 2024-07-08
Payer: COMMERCIAL

## 2024-10-24 ENCOUNTER — LAB REQUISITION (OUTPATIENT)
Dept: LAB | Facility: CLINIC | Age: 89
End: 2024-10-24
Payer: COMMERCIAL

## 2024-10-24 DIAGNOSIS — U07.1 COVID-19: ICD-10-CM

## 2024-10-24 PROCEDURE — 87635 SARS-COV-2 COVID-19 AMP PRB: CPT | Mod: ORL | Performed by: NURSE PRACTITIONER

## 2024-10-25 LAB — SARS-COV-2 RNA RESP QL NAA+PROBE: POSITIVE

## 2024-12-26 ENCOUNTER — LAB REQUISITION (OUTPATIENT)
Dept: LAB | Facility: CLINIC | Age: 89
End: 2024-12-26
Payer: COMMERCIAL

## 2024-12-26 DIAGNOSIS — Z03.818 ENCOUNTER FOR OBSERVATION FOR SUSPECTED EXPOSURE TO OTHER BIOLOGICAL AGENTS RULED OUT: ICD-10-CM

## 2024-12-26 LAB
FLUAV RNA SPEC QL NAA+PROBE: POSITIVE
FLUBV RNA RESP QL NAA+PROBE: NEGATIVE
RSV RNA SPEC NAA+PROBE: NEGATIVE
SARS-COV-2 RNA RESP QL NAA+PROBE: NEGATIVE

## 2024-12-26 PROCEDURE — 87637 SARSCOV2&INF A&B&RSV AMP PRB: CPT | Mod: ORL | Performed by: NURSE PRACTITIONER

## 2025-03-04 ENCOUNTER — LAB REQUISITION (OUTPATIENT)
Dept: LAB | Facility: CLINIC | Age: OVER 89
End: 2025-03-04
Payer: COMMERCIAL

## 2025-03-04 DIAGNOSIS — Z01.89 ENCOUNTER FOR OTHER SPECIFIED SPECIAL EXAMINATIONS: ICD-10-CM

## 2025-03-04 DIAGNOSIS — I10 ESSENTIAL (PRIMARY) HYPERTENSION: ICD-10-CM

## 2025-03-06 LAB
ANION GAP SERPL CALCULATED.3IONS-SCNC: 10 MMOL/L (ref 7–15)
BUN SERPL-MCNC: 24.7 MG/DL (ref 8–23)
CALCIUM SERPL-MCNC: 9.6 MG/DL (ref 8.8–10.4)
CHLORIDE SERPL-SCNC: 98 MMOL/L (ref 98–107)
CREAT SERPL-MCNC: 0.93 MG/DL (ref 0.51–0.95)
EGFRCR SERPLBLD CKD-EPI 2021: 58 ML/MIN/1.73M2
ERYTHROCYTE [DISTWIDTH] IN BLOOD BY AUTOMATED COUNT: 14.9 % (ref 10–15)
GLUCOSE SERPL-MCNC: 88 MG/DL (ref 70–99)
HCO3 SERPL-SCNC: 25 MMOL/L (ref 22–29)
HCT VFR BLD AUTO: 38.1 % (ref 35–47)
HGB BLD-MCNC: 12.1 G/DL (ref 11.7–15.7)
MCH RBC QN AUTO: 29 PG (ref 26.5–33)
MCHC RBC AUTO-ENTMCNC: 31.8 G/DL (ref 31.5–36.5)
MCV RBC AUTO: 91 FL (ref 78–100)
PLATELET # BLD AUTO: 315 10E3/UL (ref 150–450)
POTASSIUM SERPL-SCNC: 4.2 MMOL/L (ref 3.4–5.3)
RBC # BLD AUTO: 4.17 10E6/UL (ref 3.8–5.2)
SODIUM SERPL-SCNC: 133 MMOL/L (ref 135–145)
WBC # BLD AUTO: 5.1 10E3/UL (ref 4–11)

## 2025-03-06 PROCEDURE — 80048 BASIC METABOLIC PNL TOTAL CA: CPT | Mod: ORL | Performed by: NURSE PRACTITIONER

## 2025-03-06 PROCEDURE — 36415 COLL VENOUS BLD VENIPUNCTURE: CPT | Mod: ORL | Performed by: NURSE PRACTITIONER

## 2025-03-06 PROCEDURE — 85027 COMPLETE CBC AUTOMATED: CPT | Mod: ORL | Performed by: NURSE PRACTITIONER

## 2025-03-06 PROCEDURE — P9604 ONE-WAY ALLOW PRORATED TRIP: HCPCS | Mod: ORL | Performed by: NURSE PRACTITIONER
